# Patient Record
Sex: MALE | Race: ASIAN | URBAN - METROPOLITAN AREA
[De-identification: names, ages, dates, MRNs, and addresses within clinical notes are randomized per-mention and may not be internally consistent; named-entity substitution may affect disease eponyms.]

---

## 2017-04-12 ENCOUNTER — OFFICE VISIT (OUTPATIENT)
Dept: FAMILY MEDICINE | Facility: CLINIC | Age: 32
End: 2017-04-12
Payer: COMMERCIAL

## 2017-04-12 VITALS
WEIGHT: 170 LBS | DIASTOLIC BLOOD PRESSURE: 78 MMHG | HEIGHT: 68 IN | BODY MASS INDEX: 25.76 KG/M2 | HEART RATE: 76 BPM | SYSTOLIC BLOOD PRESSURE: 120 MMHG | TEMPERATURE: 98 F

## 2017-04-12 DIAGNOSIS — I10 BENIGN ESSENTIAL HYPERTENSION: ICD-10-CM

## 2017-04-12 DIAGNOSIS — R30.0 DYSURIA: ICD-10-CM

## 2017-04-12 DIAGNOSIS — E11.9 TYPE 2 DIABETES MELLITUS WITHOUT COMPLICATION, WITHOUT LONG-TERM CURRENT USE OF INSULIN (H): Primary | ICD-10-CM

## 2017-04-12 DIAGNOSIS — E78.5 HYPERLIPIDEMIA LDL GOAL <100: ICD-10-CM

## 2017-04-12 DIAGNOSIS — F10.10 ALCOHOL ABUSE, EPISODIC DRINKING BEHAVIOR: ICD-10-CM

## 2017-04-12 LAB
ALBUMIN UR-MCNC: NEGATIVE MG/DL
APPEARANCE UR: CLEAR
BILIRUB UR QL STRIP: NEGATIVE
COLOR UR AUTO: YELLOW
GLUCOSE UR STRIP-MCNC: >=1000 MG/DL
HBA1C MFR BLD: 8.8 % (ref 4.3–6)
HGB UR QL STRIP: NEGATIVE
KETONES UR STRIP-MCNC: NEGATIVE MG/DL
LEUKOCYTE ESTERASE UR QL STRIP: NEGATIVE
NITRATE UR QL: NEGATIVE
PH UR STRIP: 7 PH (ref 5–7)
SP GR UR STRIP: 1.01 (ref 1–1.03)
URN SPEC COLLECT METH UR: ABNORMAL
UROBILINOGEN UR STRIP-ACNC: 0.2 EU/DL (ref 0.2–1)

## 2017-04-12 PROCEDURE — 36415 COLL VENOUS BLD VENIPUNCTURE: CPT | Performed by: FAMILY MEDICINE

## 2017-04-12 PROCEDURE — 99214 OFFICE O/P EST MOD 30 MIN: CPT | Performed by: FAMILY MEDICINE

## 2017-04-12 PROCEDURE — 83036 HEMOGLOBIN GLYCOSYLATED A1C: CPT | Performed by: FAMILY MEDICINE

## 2017-04-12 PROCEDURE — 80053 COMPREHEN METABOLIC PANEL: CPT | Performed by: FAMILY MEDICINE

## 2017-04-12 PROCEDURE — 81003 URINALYSIS AUTO W/O SCOPE: CPT | Performed by: FAMILY MEDICINE

## 2017-04-12 RX ORDER — BLOOD-GLUCOSE METER
EACH MISCELLANEOUS
Qty: 1 KIT | Refills: 0 | Status: SHIPPED | OUTPATIENT
Start: 2017-04-12

## 2017-04-12 RX ORDER — LANCETS
EACH MISCELLANEOUS
Qty: 1 BOX | Status: SHIPPED | OUTPATIENT
Start: 2017-04-12

## 2017-04-12 NOTE — NURSING NOTE
"Chief Complaint   Patient presents with     Diabetes     f/u      Abdominal Pain       Initial /78 (BP Location: Left arm, Patient Position: Chair, Cuff Size: Adult Regular)  Pulse 76  Temp 98  F (36.7  C) (Tympanic)  Ht 5' 8\" (1.727 m)  Wt 170 lb (77.1 kg)  BMI 25.85 kg/m2 Estimated body mass index is 25.85 kg/(m^2) as calculated from the following:    Height as of this encounter: 5' 8\" (1.727 m).    Weight as of this encounter: 170 lb (77.1 kg).  Medication Reconciliation: complete  "

## 2017-04-12 NOTE — MR AVS SNAPSHOT
After Visit Summary   4/12/2017    Flakito Ureña    MRN: [id number]           Patient Information     Date Of Birth          1985        Visit Information        Provider Department      4/12/2017 4:00 PM [facility]        Today's Diagnoses     Type 2 diabetes mellitus without complication, without long-term current use of insulin (H)    -  1    Benign essential hypertension        Hyperlipidemia LDL goal <100        Dysuria        Alcohol abuse, episodic drinking behavior           Follow-ups after your visit        Who to contact     If you have questions or need follow up information about today's clinic visit or your schedule please contact [facility] directly at [phone].  Normal or non-critical lab and imaging results will be communicated to you by MyChart, letter or phone within 4 business days after the clinic has received the results. If you do not hear from us within 7 days, please contact the clinic through [employer] or phone. If you have a critical or abnormal lab result, we will notify you by phone as soon as possible.  Submit refill requests through [employer] or call your pharmacy and they will forward the refill request to us. Please allow 3 business days for your refill to be completed.          Additional Information About Your Visit        MyChart Information     [employer] gives you secure access to your electronic health record. If you see a primary care provider, you can also send messages to your care team and make appointments. If you have questions, please call your primary care clinic.  If you do not have a primary care provider, please call [phone] and they will assist you.        Care EveryWhere ID     This is your Care EveryWhere ID. This could be used by other organizations to access your [place] medical records  [id number]        Your Vitals Were     Pulse Temperature Height BMI (Body Mass Index)           "76 98  F (36.7  C) (Tympanic) 5' 8\" (1.727 m) 25.85 kg/m2         Blood Pressure from Last 3 Encounters:   04/12/17 120/78   11/30/16 138/88    Weight from Last 3 Encounters:   04/12/17 170 lb (77.1 kg)              We Performed the Following     Comprehensive metabolic panel     Hemoglobin A1c     UA reflex to Microscopic          Today's Medication Changes          These changes are accurate as of: 4/12/17  5:14 PM.  If you have any questions, ask your nurse or doctor.               Start taking these medicines.        Dose/Directions    blood glucose monitoring lancets   Used for:  Type 2 diabetes mellitus without complication, without long-term current use of insulin (H)   Started by:  Amari Garg MD        Use to test blood sugar one times daily or as directed.  Ok to substitute alternative if insurance prefers.   Quantity:  1 Box   Refills:  prn       blood glucose monitoring meter device kit   Used for:  Type 2 diabetes mellitus without complication, without long-term current use of insulin (H)   Started by:  Amari Garg MD        Use to test blood sugar 1 times daily or as directed.  Ok to substitute alternative if insurance prefers.   Quantity:  1 kit   Refills:  0       blood glucose monitoring test strip   Commonly known as:  ACCU-CHEK HILARIO PLUS   Used for:  Type 2 diabetes mellitus without complication, without long-term current use of insulin (H)   Started by:  Amari Garg MD        Use to test blood sugar 1 times daily or as directed.  Ok to substitute alternative if insurance prefers.   Quantity:  100 strip   Refills:  prn       insulin glargine 100 UNIT/ML injection   Commonly known as:  LANTUS SOLOSTAR   Used for:  Type 2 diabetes mellitus without complication, without long-term current use of insulin (H)   Started by:  Amari Garg MD        Dose:  15 Units   Inject 15 Units Subcutaneous At Bedtime Increase lantus to 2 units if morning blood suger consistently >150 over 1 week.   Quantity:  15 " mL   Refills:  1            Where to get your medicines      These medications were sent to [id number] - [place] [[facility] [place] - [address]  [address] [place] [address] [address] [address]     Phone:  [phone]     blood glucose monitoring lancets    blood glucose monitoring meter device kit    blood glucose monitoring test strip    insulin glargine 100 UNIT/ML injection    metFORMIN 1000 MG tablet                Primary Care Provider Office Phone # Fax #    Amari Garg [phone]       [facility] [address] [place] [address] [address]  [place] [address]        Thank you!     Thank you for choosing [facility]  for your care. Our goal is always to provide you with excellent care. Hearing back from our patients is one way we can continue to improve our services. Please take a few minutes to complete the written survey that you may receive in the mail after your visit with us. Thank you!             Your Updated Medication List - Protect others around you: Learn how to safely use, store and throw away your medicines at www.disposemymeds.org.          This list is accurate as of: 4/12/17  5:14 PM.  Always use your most recent med list.                   Brand Name Dispense Instructions for use    blood glucose monitoring lancets     1 Box    Use to test blood sugar one times daily or as directed.  Ok to substitute alternative if insurance prefers.       blood glucose monitoring meter device kit     1 kit    Use to test blood sugar 1 times daily or as directed.  Ok to substitute alternative if insurance prefers.       blood glucose monitoring test strip    ACCU-CHEK HILARIO PLUS    100 strip    Use to test blood sugar 1 times daily or as directed.  Ok to substitute alternative if insurance prefers.       fenofibrate 54 MG tablet     90 tablet    Take 1 tablet (54 mg) by mouth daily       insulin glargine 100 UNIT/ML injection    LANTUS SOLOSTAR    15 mL     Inject 15 Units Subcutaneous At Bedtime Increase lantus to 2 units if morning blood suger consistently >150 over 1 week.       losartan 25 MG tablet    COZAAR    90 tablet    Take 0.5 tablets (12.5 mg) by mouth daily       metFORMIN 1000 MG tablet    GLUCOPHAGE    180 tablet    Take 1 tablet (1,000 mg) by mouth 2 times daily (with meals)       metoprolol 25 MG 24 hr tablet    TOPROL-XL    90 tablet    Take 1 tablet (25 mg) by mouth daily       pantoprazole 40 MG EC tablet    PROTONIX    30 tablet    Take 1 tablet (40 mg) by mouth daily       VITAMIN D (CHOLECALCIFEROL) PO      Take by mouth daily

## 2017-04-12 NOTE — PROGRESS NOTES
SUBJECTIVE:                                                    Flakito Ureña is a 31 year old male who presents to clinic today for the following health issues:      Diabetes Follow-up  He was on metformin along with sitaglipitin combination but insurance did not cover it , out of pocket was high he just used metformin.  He has been taking it .    Patient is checking blood sugars: not at all    Diabetic concerns: None and other - feels like they have been high      Symptoms of hypoglycemia (low blood sugar): shaky, dizzy, weak, lethargy, blurred vision, confusion - past 3-4 days has been occurring frequently.      Paresthesias (numbness or burning in feet) or sores: Yes burning      Date of last diabetic eye exam: 2 years ago        Amount of exercise or physical activity: 2-3 days/week for an average of 30-45 minutes    Problems taking medications regularly: No    Medication side effects: none    Diet: regular (no restrictions)      ABDOMINAL and FLANK PAIN     Onset: 2 months     No nausea or vomiting, no pain, some time burning sensation with urination, no STD concerns.    No fever, chills.    Description:   Character: Dull ache  Location: epigastric region pelvic region  Radiation: None    Intensity: moderate    Progression of Symptoms:  intermittent    Accompanying Signs & Symptoms:  Fever/Chills?: no   Gas/Bloating: no   Nausea: no   Vomitting: no   Diarrhea?: YES  Constipation:no   Dysuria or Hematuria: YES- pt reports it is difficulty with urgency for a year, getting worse    History:   Trauma: no   Previous similar pain: no    Previous tests done: none    Precipitating factors:   Does the pain change with:     Food: no      BM: no     Urination: no     Alleviating factors:  na    Therapies Tried and outcome: na    LMP:  not applicable       Problem list and histories reviewed & adjusted, as indicated.  Additional history: as documented        Reviewed and updated as needed this visit by clinical  "staff  Tobacco  Allergies  Meds  Problems  Soc Hx      Reviewed and updated as needed this visit by Provider  Allergies  Meds  Problems         ROS:  C: NEGATIVE for fever, chills, change in weight  E/M: NEGATIVE for ear, mouth and throat problems  R: NEGATIVE for significant cough or SOB  CV: NEGATIVE for chest pain, palpitations or peripheral edema    OBJECTIVE:                                                    /78 (BP Location: Left arm, Patient Position: Chair, Cuff Size: Adult Regular)  Pulse 76  Temp 98  F (36.7  C) (Tympanic)  Ht 5' 8\" (1.727 m)  Wt 170 lb (77.1 kg)  BMI 25.85 kg/m2  Body mass index is 25.85 kg/(m^2).   GENERAL: healthy, alert, well nourished, well hydrated, no distress  HENT: ear canals- normal; TMs- normal; Nose- normal; Mouth- no ulcers, no lesions  NECK: no tenderness, no adenopathy, no asymmetry, no masses, no stiffness; thyroid- normal to palpation  RESP: lungs clear to auscultation - no rales, no rhonchi, no wheezes  CV: regular rates and rhythm, normal S1 S2, no S3 or S4 and no murmur, no click or rub -  ABDOMEN: soft, no tenderness, no  hepatosplenomegaly, no masses, normal bowel sounds         ASSESSMENT/PLAN:                                                        ICD-10-CM    1. Type 2 diabetes mellitus without complication, without long-term current use of insulin (H) E11.9 Hemoglobin A1c     insulin glargine (LANTUS SOLOSTAR) 100 UNIT/ML injection     blood glucose monitoring (ACCU-CHEK HILARIO PLUS) test strip     blood glucose monitoring (ACCU-CHEK HILARIO PLUS) meter device kit     blood glucose monitoring (SOFTCLIX) lancets     metFORMIN (GLUCOPHAGE) 1000 MG tablet   2. Benign essential hypertension I10 Comprehensive metabolic panel   3. Hyperlipidemia LDL goal <100 E78.5 Comprehensive metabolic panel   4. Dysuria R30.0 UA reflex to Microscopic   5. Alcohol abuse, episodic drinking behavior F10.10        Patient AIC is elevated, his diet is not great, we will " continue metformin along with that we will start him on lantus 15 units at night, he should increase 2 units weekly if fasting number are>150 persistently. Can go up to 20 units max, if still not controled like fasting between  and random<180, he should follow up.  Check your blood sugar once  Day, follow up in 2 months. alcohol intake is dicussed in detail, he needs to cut down, this is not helping him, he will work on it. No signs of any acute abdomen, increase urination most likely due to the uncontrolled diabetes.    Amari Garg MD  [facility]

## 2017-04-13 DIAGNOSIS — E11.9 TYPE 2 DIABETES MELLITUS WITHOUT COMPLICATION, WITHOUT LONG-TERM CURRENT USE OF INSULIN (H): Primary | ICD-10-CM

## 2017-04-13 LAB
ALBUMIN SERPL-MCNC: 4.4 G/DL (ref 3.4–5)
ALP SERPL-CCNC: 89 U/L (ref 40–150)
ALT SERPL W P-5'-P-CCNC: 74 U/L (ref 0–70)
ANION GAP SERPL CALCULATED.3IONS-SCNC: 14 MMOL/L (ref 3–14)
AST SERPL W P-5'-P-CCNC: 32 U/L (ref 0–45)
BILIRUB SERPL-MCNC: 0.3 MG/DL (ref 0.2–1.3)
BUN SERPL-MCNC: 9 MG/DL (ref 7–30)
CALCIUM SERPL-MCNC: 9.3 MG/DL (ref 8.5–10.1)
CHLORIDE SERPL-SCNC: 96 MMOL/L (ref 94–109)
CO2 SERPL-SCNC: 24 MMOL/L (ref 20–32)
CREAT SERPL-MCNC: 0.84 MG/DL (ref 0.66–1.25)
GFR SERPL CREATININE-BSD FRML MDRD: ABNORMAL ML/MIN/1.7M2
GLUCOSE SERPL-MCNC: 290 MG/DL (ref 70–99)
POTASSIUM SERPL-SCNC: 4.2 MMOL/L (ref 3.4–5.3)
PROT SERPL-MCNC: 8.4 G/DL (ref 6.8–8.8)
SODIUM SERPL-SCNC: 133 MMOL/L (ref 133–144)

## 2017-04-13 RX ORDER — PANTOPRAZOLE SODIUM 40 MG/1
40 TABLET, DELAYED RELEASE ORAL DAILY
Qty: 90 TABLET | Refills: 3 | Status: SHIPPED | OUTPATIENT
Start: 2017-04-13 | End: 2018-04-23

## 2017-04-13 NOTE — TELEPHONE ENCOUNTER
Protonix: Prescription approved per [facility], [facility] or [facility] refill protocol.  Li Cruz RN  [employer]        Routing refill request to provider for review/approval because:  Medication is reported/historical  Li Cruz RN  [employer]

## 2017-04-13 NOTE — TELEPHONE ENCOUNTER
Pen needles         Last Written Prescription Date: historical  Last Fill Quantity: ?, # refills: ?  Last Office Visit with [facility], [facility] or [facility] prescribing provider:  4/12/17        BP Readings from Last 3 Encounters:   04/12/17 120/78   11/30/16 138/88     Lab Results   Component Value Date    MICROL 98 12/02/2016     Lab Results   Component Value Date    UMALCR 28.24 12/02/2016     Creatinine   Date Value Ref Range Status   12/02/2016 0.80 0.66 - 1.25 mg/dL Final   ]  GFR Estimate   Date Value Ref Range Status   12/02/2016 >90  Non  GFR Calc   >60 mL/min/1.7m2 Final     GFR Estimate If Black   Date Value Ref Range Status   12/02/2016 >90   GFR Calc   >60 mL/min/1.7m2 Final     Lab Results   Component Value Date    CHOL 185 12/02/2016     Lab Results   Component Value Date    HDL 31 12/02/2016     Lab Results   Component Value Date    LDL  12/02/2016     Cannot estimate LDL when triglyceride exceeds 400 mg/dL     Lab Results   Component Value Date    TRIG 515 12/02/2016     No results found for: CHOLHDLRATIO  Lab Results   Component Value Date    AST 32 12/02/2016     Lab Results   Component Value Date    ALT 64 12/02/2016     Lab Results   Component Value Date    A1C 8.8 04/12/2017    A1C 7.8 12/02/2016     Potassium   Date Value Ref Range Status   12/02/2016 4.1 3.4 - 5.3 mmol/L Final     pantoprazole (PROTONIX) 40 MG      Last Written Prescription Date: 12/2/16  Last Fill Quantity: 30,  # refills: 3   Last Office Visit with [facility], [facility] or [facility] prescribing provider: 4/12/17

## 2017-05-02 DIAGNOSIS — E11.9 TYPE 2 DIABETES MELLITUS WITHOUT COMPLICATION, WITHOUT LONG-TERM CURRENT USE OF INSULIN (H): Primary | ICD-10-CM

## 2017-05-02 RX ORDER — BLOOD-GLUCOSE METER
EACH MISCELLANEOUS
Qty: 1 KIT | Refills: 0 | Status: SHIPPED | OUTPATIENT
Start: 2017-05-02

## 2017-05-02 NOTE — TELEPHONE ENCOUNTER
Note from [employer] stating accu chek irena plus is not covered.  Lifeschan(onetouch) is preferred alternative.     New order has been pended. Please sign.    Panda CORNEJO CMA

## 2017-06-11 DIAGNOSIS — E78.5 HYPERLIPIDEMIA LDL GOAL <100: ICD-10-CM

## 2017-06-11 DIAGNOSIS — E78.1 HIGH BLOOD TRIGLYCERIDES: ICD-10-CM

## 2017-06-12 NOTE — TELEPHONE ENCOUNTER
fenofibrate 54 MG tablet  Last Written Prescription Date: 12/2/2016  Last Fill Quantity: 90, # refills: 1    Last Office Visit with [facility] or [facility] prescribing provider:  4/12/2017  Future Office Visit:      Cholesterol   Date Value Ref Range Status   12/02/2016 185 <200 mg/dL Final     HDL Cholesterol   Date Value Ref Range Status   12/02/2016 31 (L) >39 mg/dL Final     LDL Cholesterol Calculated   Date Value Ref Range Status   12/02/2016  <100 mg/dL Final    Cannot estimate LDL when triglyceride exceeds 400 mg/dL     Triglycerides   Date Value Ref Range Status   12/02/2016 515 (H) <150 mg/dL Final     Comment:     Borderline high:  150-199 mg/dl   High:             200-499 mg/dl   Very high:       >499 mg/dl   Fasting specimen       ALT   Date Value Ref Range Status   04/12/2017 74 (H) 0 - 70 U/L Final        Jamila Belcher CMA

## 2017-06-13 RX ORDER — FENOFIBRATE 54 MG/1
TABLET ORAL
Qty: 30 TABLET | Refills: 1 | Status: SHIPPED | OUTPATIENT
Start: 2017-06-13 | End: 2017-09-01

## 2017-06-13 NOTE — TELEPHONE ENCOUNTER
Routing refill request to provider for review/approval because:  Labs out of range:  ;ipids.  Jackie Enriquez RN  [facility]  [phone]

## 2017-08-16 ENCOUNTER — OFFICE VISIT (OUTPATIENT)
Dept: FAMILY MEDICINE | Facility: CLINIC | Age: 32
End: 2017-08-16
Payer: COMMERCIAL

## 2017-08-16 VITALS
WEIGHT: 173 LBS | OXYGEN SATURATION: 99 % | SYSTOLIC BLOOD PRESSURE: 130 MMHG | TEMPERATURE: 96.4 F | BODY MASS INDEX: 26.3 KG/M2 | HEART RATE: 97 BPM | DIASTOLIC BLOOD PRESSURE: 80 MMHG

## 2017-08-16 DIAGNOSIS — Z79.4 TYPE 2 DIABETES MELLITUS WITHOUT COMPLICATION, WITH LONG-TERM CURRENT USE OF INSULIN (H): ICD-10-CM

## 2017-08-16 DIAGNOSIS — E78.5 HYPERLIPIDEMIA LDL GOAL <100: ICD-10-CM

## 2017-08-16 DIAGNOSIS — Z13.89 SCREENING FOR DIABETIC PERIPHERAL NEUROPATHY: Primary | ICD-10-CM

## 2017-08-16 DIAGNOSIS — E11.9 TYPE 2 DIABETES MELLITUS WITHOUT COMPLICATION, WITHOUT LONG-TERM CURRENT USE OF INSULIN (H): ICD-10-CM

## 2017-08-16 DIAGNOSIS — E11.9 TYPE 2 DIABETES MELLITUS WITHOUT COMPLICATION, WITH LONG-TERM CURRENT USE OF INSULIN (H): ICD-10-CM

## 2017-08-16 DIAGNOSIS — F10.10 ALCOHOL ABUSE, EPISODIC DRINKING BEHAVIOR: ICD-10-CM

## 2017-08-16 LAB — HBA1C MFR BLD: 7.6 % (ref 4.3–6)

## 2017-08-16 PROCEDURE — 36415 COLL VENOUS BLD VENIPUNCTURE: CPT | Performed by: FAMILY MEDICINE

## 2017-08-16 PROCEDURE — 82043 UR ALBUMIN QUANTITATIVE: CPT | Performed by: FAMILY MEDICINE

## 2017-08-16 PROCEDURE — 83036 HEMOGLOBIN GLYCOSYLATED A1C: CPT | Performed by: FAMILY MEDICINE

## 2017-08-16 PROCEDURE — 99214 OFFICE O/P EST MOD 30 MIN: CPT | Performed by: FAMILY MEDICINE

## 2017-08-16 PROCEDURE — 99207 C FOOT EXAM  NO CHARGE: CPT | Performed by: FAMILY MEDICINE

## 2017-08-16 NOTE — PROGRESS NOTES
SUBJECTIVE:                                                    Flakito Ureña is a 32 year old male who presents to clinic today for the following health issues:      Diabetes Follow-up  Control is slight better with the addition of lantus.   Continue to have episodic drinking issues.  sedentary life style, food choices are not great.  No chest pain or shortness of breath.      Patient is checking blood sugars: rarely.      Diabetic concerns: other - blood sugars over 200 over 15/30 days      Symptoms of hypoglycemia (low blood sugar): shaky, dizzy, weak after exercise      Paresthesias (numbness or burning in feet) or sores: Yes occasional burning      Date of last diabetic eye exam: 2-3 years ago        Amount of exercise or physical activity: 4 days per week     Problems taking medications regularly: No    Medication side effects: none  Diet: regular (no restrictions)        Issue with insomnia.      Problem list and histories reviewed & adjusted, as indicated.  Additional history: as documented    Patient Active Problem List   Diagnosis     Benign essential hypertension     Hyperlipidemia LDL goal <100     Pulmonary TB treated in 2009     Alcohol abuse, episodic drinking behavior     Type 2 diabetes mellitus without complication, with long-term current use of insulin (H)     Past Surgical History:   Procedure Laterality Date     THROAT SURGERY      vocal cord surgery       Social History   Substance Use Topics     Smoking status: Former Smoker     Smokeless tobacco: Never Used     Alcohol use 0.0 oz/week     0 Standard drinks or equivalent per week     Family History   Problem Relation Age of Onset     DIABETES Father      DIABETES Mother      DIABETES Brother          Current Outpatient Prescriptions   Medication Sig Dispense Refill     insulin glargine (LANTUS SOLOSTAR) 100 UNIT/ML injection Inject 15 Units Subcutaneous At Bedtime Increase lantus to 2 units if morning blood suger consistently >150 over 1  week. 15 mL 1     metFORMIN (GLUCOPHAGE) 1000 MG tablet Take 1 tablet (1,000 mg) by mouth 2 times daily (with meals) 180 tablet 1     fenofibrate 54 MG tablet TAKE ONE TABLET BY MOUTH ONE TIME DAILY  30 tablet 1     pantoprazole (PROTONIX) 40 MG EC tablet Take 1 tablet (40 mg) by mouth daily 90 tablet 3     losartan (COZAAR) 25 MG tablet Take 0.5 tablets (12.5 mg) by mouth daily 90 tablet 1     VITAMIN D, CHOLECALCIFEROL, PO Take by mouth daily       metoprolol (TOPROL-XL) 25 MG 24 hr tablet Take 1 tablet (25 mg) by mouth daily 90 tablet 3     blood glucose monitoring (NO BRAND SPECIFIED) test strip Use to test blood sugars 1 times daily or as directed 100 strip 11     blood glucose monitoring (ONE TOUCH ULTRA 2) meter device kit Use to test blood sugars 1 times daily or as directed. 1 kit 0     insulin pen needle (BD ALEXANDRO U/F) 32G X 4 MM Use 1 daily or as directed. 100 each prn     blood glucose monitoring (ACCU-CHEK HILARIO PLUS) test strip Use to test blood sugar 1 times daily or as directed.  Ok to substitute alternative if insurance prefers. 100 strip prn     blood glucose monitoring (ACCU-CHEK HILARIO PLUS) meter device kit Use to test blood sugar 1 times daily or as directed.  Ok to substitute alternative if insurance prefers. 1 kit 0     blood glucose monitoring (SOFTCLIX) lancets Use to test blood sugar one times daily or as directed.  Ok to substitute alternative if insurance prefers. 1 Box prn         Reviewed and updated as needed this visit by clinical staffTobacco  Allergies  Meds  Soc Hx      Reviewed and updated as needed this visit by Provider         ROS:  C: NEGATIVE for fever, chills, change in weight  E/M: NEGATIVE for ear, mouth and throat problems  R: NEGATIVE for significant cough or SOB  CV: NEGATIVE for chest pain, palpitations or peripheral edema    OBJECTIVE:                                                    /80  Pulse 97  Temp 96.4  F (35.8  C) (Tympanic)  Wt 173 lb (78.5 kg)   SpO2 99%  BMI 26.3 kg/m2  Body mass index is 26.3 kg/(m^2).   GENERAL: healthy, alert, well nourished, well hydrated, no distress  HENT: ear canals- normal; TMs- normal; Nose- normal; Mouth- no ulcers, no lesions  NECK: no tenderness, no adenopathy, no asymmetry, no masses, no stiffness; thyroid- normal to palpation  RESP: lungs clear to auscultation - no rales, no rhonchi, no wheezes  CV: regular rates and rhythm, normal S1 S2, no S3 or S4 and no murmur, no click or rub -  ABDOMEN: soft, no tenderness, no  hepatosplenomegaly, no masses, normal bowel sounds         ASSESSMENT/PLAN:                                                        ICD-10-CM    1. Screening for diabetic peripheral neuropathy Z13.89 FOOT EXAM  NO CHARGE [61603.114]   2. Type 2 diabetes mellitus without complication, with long-term current use of insulin (H) E11.9 FOOT EXAM  NO CHARGE [05294.114]    Z79.4 Hemoglobin A1c     Albumin Random Urine Quantitative   3. Hyperlipidemia LDL goal <100 E78.5    4. Alcohol abuse, episodic drinking behavior F10.10    5. Type 2 diabetes mellitus without complication, without long-term current use of insulin (H) E11.9 insulin glargine (LANTUS SOLOSTAR) 100 UNIT/ML injection     metFORMIN (GLUCOPHAGE) 1000 MG tablet       Patient AIC is better, since addition of lantus. Suggested continue the metformin and lantus. Work on dietary habits and avoid excessive drinking.  Labs ordered will follow up on that.  Insomnia issues. suggested OTC melatonin to see if it helps.    Amrai Garg MD  [facility]

## 2017-08-16 NOTE — MR AVS SNAPSHOT
After Visit Summary   8/16/2017    Flakito Ureña    MRN: [id number]           Patient Information     Date Of Birth          1985        Visit Information        Provider Department      8/16/2017 6:00 PM Amari Garg MD [facility]        Today's Diagnoses     Screening for diabetic peripheral neuropathy    -  1    Type 2 diabetes mellitus without complication, with long-term current use of insulin (H)        Hyperlipidemia LDL goal <100        Alcohol abuse, episodic drinking behavior        Type 2 diabetes mellitus without complication, without long-term current use of insulin (H)           Follow-ups after your visit        Your next 10 appointments already scheduled     Aug 22, 2017  8:00 AM CDT   LAB with EC LAB   [facility] ([facility])    [address] [place] [id number] [phone]           OUTSIDE LABS: Please include name of facility and Physician that is requesting outside labs be drawn.  Please indicate if labs are fasting or non-fasting on appt notes.  Be as specific as you can on which labs are being drawn.              Who to contact     If you have questions or need follow up information about today's clinic visit or your schedule please contact [facility] directly at [phone].  Normal or non-critical lab and imaging results will be communicated to you by MyChart, letter or phone within 4 business days after the clinic has received the results. If you do not hear from us within 7 days, please contact the clinic through MyChart or phone. If you have a critical or abnormal lab result, we will notify you by phone as soon as possible.  Submit refill requests through [email or web address] or call your pharmacy and they will forward the refill request to us. Please allow 3 business days for your refill to be completed.          Additional Information About Your Visit        MyChart Information      [employer] gives you secure access to your electronic health record. If you see a primary care provider, you can also send messages to your care team and make appointments. If you have questions, please call your primary care clinic.  If you do not have a primary care provider, please call [phone] and they will assist you.        Care EveryWhere ID     This is your Care EveryWhere ID. This could be used by other organizations to access your [place] medical records  [id number]        Your Vitals Were     Pulse Temperature Pulse Oximetry BMI (Body Mass Index)          97 96.4  F (35.8  C) (Tympanic) 99% 26.3 kg/m2         Blood Pressure from Last 3 Encounters:   08/16/17 130/80   04/12/17 120/78   11/30/16 138/88    Weight from Last 3 Encounters:   08/16/17 173 lb (78.5 kg)   04/12/17 170 lb (77.1 kg)              We Performed the Following     Albumin Random Urine Quantitative     FOOT EXAM  NO CHARGE [[id number]]     Hemoglobin A1c          Where to get your medicines      These medications were sent to [facility] [id number] [place], [place] [address] AT [facility] [address]  [address], [facility] [address]    Hours:  24-hours Phone:  [phone]     insulin glargine 100 UNIT/ML injection    metFORMIN 1000 MG tablet          Primary Care Provider Office Phone # Fax #    Amari Garg [phone]       [address]  [place] [address]        Equal Access to Services     [facility]: Hadii aad ku hadasho Soomaali, waaxda luqadaha, qaybta kaalmada adeegyada, hiram castro haydominick miranda. So [facility] [phone].    ATENCIÓN: Si habla español, tiene a escobar disposición servicios gratuitos de asistencia lingüística. Llame al [phone].    We comply with applicable federal civil rights laws and [place] laws. We do not discriminate on the basis of race, color, national origin, age, disability sex, sexual orientation or gender  identity.            Thank you!     Thank you for choosing [facility] [place]  for your care. Our goal is always to provide you with excellent care. Hearing back from our patients is one way we can continue to improve our services. Please take a few minutes to complete the written survey that you may receive in the mail after your visit with us. Thank you!             Your Updated Medication List - Protect others around you: Learn how to safely use, store and throw away your medicines at www.disposemymeds.org.          This list is accurate as of: 8/16/17 11:59 PM.  Always use your most recent med list.                   Brand Name Dispense Instructions for use Diagnosis    blood glucose monitoring lancets     1 Box    Use to test blood sugar one times daily or as directed.  Ok to substitute alternative if insurance prefers.    Type 2 diabetes mellitus without complication, without long-term current use of insulin (H)       * blood glucose monitoring meter device kit     1 kit    Use to test blood sugar 1 times daily or as directed.  Ok to substitute alternative if insurance prefers.    Type 2 diabetes mellitus without complication, without long-term current use of insulin (H)       * blood glucose monitoring meter device kit     1 kit    Use to test blood sugars 1 times daily or as directed.    Type 2 diabetes mellitus without complication, without long-term current use of insulin (H)       * blood glucose monitoring test strip    ACCU-CHEK HILARIO PLUS    100 strip    Use to test blood sugar 1 times daily or as directed.  Ok to substitute alternative if insurance prefers.    Type 2 diabetes mellitus without complication, without long-term current use of insulin (H)       * blood glucose monitoring test strip    no brand specified    100 strip    Use to test blood sugars 1 times daily or as directed    Type 2 diabetes mellitus without complication, without long-term current use of insulin (H)        fenofibrate 54 MG tablet     30 tablet    TAKE ONE TABLET BY MOUTH ONE TIME DAILY    Hyperlipidemia LDL goal <100, High blood triglycerides       insulin glargine 100 UNIT/ML injection    LANTUS SOLOSTAR    15 mL    Inject 15 Units Subcutaneous At Bedtime Increase lantus to 2 units if morning blood suger consistently >150 over 1 week.    Type 2 diabetes mellitus without complication, without long-term current use of insulin (H)       insulin pen needle 32G X 4 MM    BD ALEXANDRO U/F    100 each    Use 1 daily or as directed.    Type 2 diabetes mellitus without complication, without long-term current use of insulin (H)       losartan 25 MG tablet    COZAAR    90 tablet    Take 0.5 tablets (12.5 mg) by mouth daily    Type 2 diabetes mellitus without complication, without long-term current use of insulin (H), Benign essential hypertension, Routine general medical examination at a health care facility       metFORMIN 1000 MG tablet    GLUCOPHAGE    180 tablet    Take 1 tablet (1,000 mg) by mouth 2 times daily (with meals)    Type 2 diabetes mellitus without complication, without long-term current use of insulin (H)       metoprolol 25 MG 24 hr tablet    TOPROL-XL    90 tablet    Take 1 tablet (25 mg) by mouth daily    Benign essential hypertension       pantoprazole 40 MG EC tablet    PROTONIX    90 tablet    Take 1 tablet (40 mg) by mouth daily    Type 2 diabetes mellitus without complication, without long-term current use of insulin (H)       VITAMIN D (CHOLECALCIFEROL) PO      Take by mouth daily        * Notice:  This list has 4 medication(s) that are the same as other medications prescribed for you. Read the directions carefully, and ask your doctor or other care provider to review them with you.

## 2017-08-16 NOTE — NURSING NOTE
"Chief Complaint   Patient presents with     Diabetes     not fasting        Initial /80  Pulse 97  Temp 96.4  F (35.8  C) (Tympanic)  Wt 173 lb (78.5 kg)  SpO2 99%  BMI 26.3 kg/m2 Estimated body mass index is 26.3 kg/(m^2) as calculated from the following:    Height as of 4/12/17: 5' 8\" (1.727 m).    Weight as of this encounter: 173 lb (78.5 kg).  Medication Reconciliation: complete    Current Outpatient Prescriptions   Medication Sig Dispense Refill     fenofibrate 54 MG tablet TAKE ONE TABLET BY MOUTH ONE TIME DAILY  30 tablet 1     pantoprazole (PROTONIX) 40 MG EC tablet Take 1 tablet (40 mg) by mouth daily 90 tablet 3     insulin glargine (LANTUS SOLOSTAR) 100 UNIT/ML injection Inject 15 Units Subcutaneous At Bedtime Increase lantus to 2 units if morning blood suger consistently >150 over 1 week. 15 mL 1     metFORMIN (GLUCOPHAGE) 1000 MG tablet Take 1 tablet (1,000 mg) by mouth 2 times daily (with meals) 180 tablet 1     losartan (COZAAR) 25 MG tablet Take 0.5 tablets (12.5 mg) by mouth daily 90 tablet 1     VITAMIN D, CHOLECALCIFEROL, PO Take by mouth daily       metoprolol (TOPROL-XL) 25 MG 24 hr tablet Take 1 tablet (25 mg) by mouth daily 90 tablet 3     blood glucose monitoring (NO BRAND SPECIFIED) test strip Use to test blood sugars 1 times daily or as directed 100 strip 11     blood glucose monitoring (ONE TOUCH ULTRA 2) meter device kit Use to test blood sugars 1 times daily or as directed. 1 kit 0     insulin pen needle (BD ALEXANDRO U/F) 32G X 4 MM Use 1 daily or as directed. 100 each prn     blood glucose monitoring (ACCU-CHEK HILARIO PLUS) test strip Use to test blood sugar 1 times daily or as directed.  Ok to substitute alternative if insurance prefers. 100 strip prn     blood glucose monitoring (ACCU-CHEK HILARIO PLUS) meter device kit Use to test blood sugar 1 times daily or as directed.  Ok to substitute alternative if insurance prefers. 1 kit 0     blood glucose monitoring (SOFTCLIX) lancets " Use to test blood sugar one times daily or as directed.  Ok to substitute alternative if insurance prefers. 1 Box prn       Panda CORNEJO, CMA

## 2017-08-17 LAB
CREAT UR-MCNC: 14 MG/DL
MICROALBUMIN UR-MCNC: <5 MG/L
MICROALBUMIN/CREAT UR: NORMAL MG/G CR (ref 0–17)

## 2017-09-01 DIAGNOSIS — E78.5 HYPERLIPIDEMIA LDL GOAL <100: ICD-10-CM

## 2017-09-01 DIAGNOSIS — E78.1 HIGH BLOOD TRIGLYCERIDES: ICD-10-CM

## 2017-09-01 NOTE — TELEPHONE ENCOUNTER
Fenofibrate 54 MG tablet       Last Written Prescription Date: 06/13/2017  Last Fill Quantity: 30 tablet, # refills: 1    Last Office Visit with G, P or [facility] prescribing provider:  08/16/2017   Future Office Visit:      Cholesterol   Date Value Ref Range Status   12/02/2016 185 <200 mg/dL Final     HDL Cholesterol   Date Value Ref Range Status   12/02/2016 31 (L) >39 mg/dL Final     LDL Cholesterol Calculated   Date Value Ref Range Status   12/02/2016  <100 mg/dL Final    Cannot estimate LDL when triglyceride exceeds 400 mg/dL     Triglycerides   Date Value Ref Range Status   12/02/2016 515 (H) <150 mg/dL Final     Comment:     Borderline high:  150-199 mg/dl   High:             200-499 mg/dl   Very high:       >499 mg/dl   Fasting specimen       ALT   Date Value Ref Range Status   04/12/2017 74 (H) 0 - 70 U/L Final        Christen Rodrigez MA

## 2017-09-05 RX ORDER — FENOFIBRATE 54 MG/1
TABLET ORAL
Qty: 30 TABLET | Refills: 0 | Status: SHIPPED | OUTPATIENT
Start: 2017-09-05 | End: 2017-09-22

## 2017-09-05 NOTE — TELEPHONE ENCOUNTER
Routing refill request to provider for review/approval because:  Labs out of range:  Lipids    Jackie Enriquez,RN  [facility]  [phone]

## 2017-09-20 DIAGNOSIS — E78.1 HIGH BLOOD TRIGLYCERIDES: ICD-10-CM

## 2017-09-20 DIAGNOSIS — E78.5 HYPERLIPIDEMIA LDL GOAL <100: ICD-10-CM

## 2017-09-20 PROCEDURE — 80061 LIPID PANEL: CPT | Performed by: FAMILY MEDICINE

## 2017-09-20 PROCEDURE — 36415 COLL VENOUS BLD VENIPUNCTURE: CPT | Performed by: FAMILY MEDICINE

## 2017-09-21 LAB
CHOLEST SERPL-MCNC: 200 MG/DL
HDLC SERPL-MCNC: 41 MG/DL
LDLC SERPL CALC-MCNC: 99 MG/DL
NONHDLC SERPL-MCNC: 159 MG/DL
TRIGL SERPL-MCNC: 299 MG/DL

## 2017-09-22 RX ORDER — FENOFIBRATE 54 MG/1
54 TABLET ORAL DAILY
Qty: 30 TABLET | Refills: 6 | Status: SHIPPED | OUTPATIENT
Start: 2017-09-22 | End: 2018-03-08

## 2017-10-10 DIAGNOSIS — E78.1 HIGH BLOOD TRIGLYCERIDES: ICD-10-CM

## 2017-10-10 DIAGNOSIS — E78.5 HYPERLIPIDEMIA LDL GOAL <100: ICD-10-CM

## 2017-10-11 RX ORDER — FENOFIBRATE 54 MG/1
TABLET ORAL
Qty: 30 TABLET | Refills: 0 | OUTPATIENT
Start: 2017-10-11

## 2017-10-11 NOTE — TELEPHONE ENCOUNTER
Patient has refills on fie, request denied.   Siomara Alexandra RN   [facility] - Triage

## 2017-10-11 NOTE — TELEPHONE ENCOUNTER
Fenofibrate       Last Written Prescription Date: 9/22/17  Last Fill Quantity: 30, # refills: 6    Last Office Visit with [facility], [facility] or [facility] prescribing provider:  8/16/17   Future Office Visit:      Cholesterol   Date Value Ref Range Status   09/20/2017 200 (H) <200 mg/dL Final     Comment:     Desirable:       <200 mg/dl     HDL Cholesterol   Date Value Ref Range Status   09/20/2017 41 >39 mg/dL Final     LDL Cholesterol Calculated   Date Value Ref Range Status   09/20/2017 99 <100 mg/dL Final     Comment:     Desirable:       <100 mg/dl     Triglycerides   Date Value Ref Range Status   09/20/2017 299 (H) <150 mg/dL Final     Comment:     Borderline high:  150-199 mg/dl  High:             200-499 mg/dl  Very high:       >499 mg/dl  Fasting specimen       ALT   Date Value Ref Range Status   04/12/2017 74 (H) 0 - 70 U/L Final      Yulissa Roe CMA

## 2017-10-18 DIAGNOSIS — E11.9 TYPE 2 DIABETES MELLITUS WITHOUT COMPLICATION, WITHOUT LONG-TERM CURRENT USE OF INSULIN (H): ICD-10-CM

## 2017-10-19 DIAGNOSIS — E11.9 TYPE 2 DIABETES MELLITUS WITHOUT COMPLICATION, WITHOUT LONG-TERM CURRENT USE OF INSULIN (H): ICD-10-CM

## 2017-10-19 RX ORDER — INSULIN GLARGINE 100 [IU]/ML
INJECTION, SOLUTION SUBCUTANEOUS
Qty: 3 ML | Refills: 0 | OUTPATIENT
Start: 2017-10-19

## 2017-10-19 NOTE — TELEPHONE ENCOUNTER
Patient has current rx at [facility] [address]. Pharmacy notified. Yris Sanchez RN

## 2017-10-23 RX ORDER — INSULIN GLARGINE 100 [IU]/ML
INJECTION, SOLUTION SUBCUTANEOUS
Qty: 15 ML | Refills: 0 | Status: SHIPPED | OUTPATIENT
Start: 2017-10-23 | End: 2018-02-03

## 2017-10-23 NOTE — TELEPHONE ENCOUNTER
Protocol passed  Refill request approved per [facility] protocol    Florencia Salinas RN

## 2018-02-14 DIAGNOSIS — I10 BENIGN ESSENTIAL HYPERTENSION: ICD-10-CM

## 2018-02-14 DIAGNOSIS — Z00.00 ROUTINE GENERAL MEDICAL EXAMINATION AT A HEALTH CARE FACILITY: ICD-10-CM

## 2018-02-14 DIAGNOSIS — E11.9 TYPE 2 DIABETES MELLITUS WITHOUT COMPLICATION, WITHOUT LONG-TERM CURRENT USE OF INSULIN (H): ICD-10-CM

## 2018-02-14 NOTE — TELEPHONE ENCOUNTER
"Requested Prescriptions   Pending Prescriptions Disp Refills     losartan (COZAAR) 25 MG tablet [Pharmacy Med Name: Losartan Potassium Oral Tablet 25 MG]  Last Written Prescription Date:  12/11/17  Last Fill Quantity: 15,  # refills: 1   Last office visit: 8/16/2017 with prescribing provider:  [facility]   Future Office Visit:     15 tablet 0     Sig: TAKE 0.5 TABLET BY MOUTH A DAY    Angiotensin-II Receptors Passed    2/14/2018 12:41 PM       Passed - Blood pressure under 140/90 in past 12 months    BP Readings from Last 3 Encounters:   08/16/17 130/80   04/12/17 120/78   11/30/16 138/88                Passed - Recent or future visit with authorizing provider's specialty    Patient had office visit in the last year or has a visit in the next 30 days with authorizing provider.  See \"Patient Info\" tab in inbasket, or \"Choose Columns\" in Meds & Orders section of the refill encounter.            Passed - Patient is age 18 or older       Passed - Normal serum creatinine on file in past 12 months    Recent Labs   Lab Test  04/12/17   1644   CR  0.84            Passed - Normal serum potassium on file in past 12 months    Recent Labs   Lab Test  04/12/17   1644   POTASSIUM  4.2                      "

## 2018-02-15 RX ORDER — LOSARTAN POTASSIUM 25 MG/1
TABLET ORAL
Qty: 45 TABLET | Refills: 1 | Status: SHIPPED | OUTPATIENT
Start: 2018-02-15 | End: 2018-03-07

## 2018-02-15 NOTE — TELEPHONE ENCOUNTER
Prescription approved per [facility], [facility] or [facility] refill protocol.  Li Cruz RN - Triage  [facility]

## 2018-02-21 DIAGNOSIS — E11.9 TYPE 2 DIABETES MELLITUS WITHOUT COMPLICATION, WITHOUT LONG-TERM CURRENT USE OF INSULIN (H): ICD-10-CM

## 2018-02-22 RX ORDER — INSULIN GLARGINE 100 [IU]/ML
INJECTION, SOLUTION SUBCUTANEOUS
Qty: 3 ML | Refills: 0 | Status: SHIPPED | OUTPATIENT
Start: 2018-02-22 | End: 2018-03-07

## 2018-02-22 NOTE — TELEPHONE ENCOUNTER
"Requested Prescriptions   Pending Prescriptions Disp Refills     LANTUS SOLOSTAR 100 UNIT/ML soln [Pharmacy Med Name: Lantus SoloStar Subcutaneous Solution Pen-injector 100 UNIT/ML] 3 mL 0    Last Written Prescription Date:  02/05/2018  Last Fill Quantity: 3 mL,  # refills: 0 Needs office visit for further refills  Last office visit: 8/16/2017 with prescribing provider:  08/16/2017 Amari Garg MD  Future Office Visit:     Sig: Inject 15 units subcutaneOusly at bedtime and incrEase to 2 units if morninG blood sugar consistentlY >150 over one week    Long Acting Insulin Protocol Failed    2/21/2018  7:55 PM       Failed - HgbA1C in past 3 or 6 months    Recent Labs   Lab Test  08/16/17   1837   A1C  7.6*            Failed - Recent visit with authorizing provider's specialty in past 6 months    Patient had office visit in the last 6 months or has a visit in the next 30 days with authorizing provider.  See \"Patient Info\" tab in inbasket, or \"Choose Columns\" in Meds & Orders section of the refill encounter.           Passed - Blood pressure less than 140/90 in past 6 months    BP Readings from Last 3 Encounters:   08/16/17 130/80   04/12/17 120/78   11/30/16 138/88                Passed - LDL on file in past 12 months    Recent Labs   Lab Test  09/20/17   1614   LDL  99            Passed - Microalbumin on file in past 12 months    Recent Labs   Lab Test  08/16/17   1854   MICROL  <5   UMALCR  Unable to calculate due to low value            Passed - Serum creatinine on file in past 12 months    Recent Labs   Lab Test  04/12/17   1644   CR  0.84            Passed - Patient is age 18 or older          "

## 2018-02-22 NOTE — TELEPHONE ENCOUNTER
30 day supply given.  Patient is due for an OV to follow up on DM.  Please call and assist with scheduling appointment prior to next refill   Li Cruz RN - Triage  [facility]

## 2018-03-07 ENCOUNTER — OFFICE VISIT (OUTPATIENT)
Dept: FAMILY MEDICINE | Facility: CLINIC | Age: 33
End: 2018-03-07
Payer: COMMERCIAL

## 2018-03-07 VITALS
DIASTOLIC BLOOD PRESSURE: 86 MMHG | SYSTOLIC BLOOD PRESSURE: 138 MMHG | WEIGHT: 172 LBS | BODY MASS INDEX: 26.15 KG/M2 | TEMPERATURE: 97.8 F | HEART RATE: 80 BPM

## 2018-03-07 DIAGNOSIS — I10 BENIGN ESSENTIAL HYPERTENSION: ICD-10-CM

## 2018-03-07 DIAGNOSIS — F10.10 ALCOHOL ABUSE, EPISODIC DRINKING BEHAVIOR: Primary | ICD-10-CM

## 2018-03-07 DIAGNOSIS — E11.9 TYPE 2 DIABETES MELLITUS WITHOUT COMPLICATION, WITHOUT LONG-TERM CURRENT USE OF INSULIN (H): ICD-10-CM

## 2018-03-07 DIAGNOSIS — E78.5 HYPERLIPIDEMIA LDL GOAL <100: ICD-10-CM

## 2018-03-07 DIAGNOSIS — R07.0 THROAT PAIN: ICD-10-CM

## 2018-03-07 DIAGNOSIS — E78.1 HIGH BLOOD TRIGLYCERIDES: ICD-10-CM

## 2018-03-07 LAB — HBA1C MFR BLD: 7.2 % (ref 4.3–6)

## 2018-03-07 PROCEDURE — 80061 LIPID PANEL: CPT | Performed by: FAMILY MEDICINE

## 2018-03-07 PROCEDURE — 82043 UR ALBUMIN QUANTITATIVE: CPT | Performed by: FAMILY MEDICINE

## 2018-03-07 PROCEDURE — 80053 COMPREHEN METABOLIC PANEL: CPT | Performed by: FAMILY MEDICINE

## 2018-03-07 PROCEDURE — 99214 OFFICE O/P EST MOD 30 MIN: CPT | Performed by: FAMILY MEDICINE

## 2018-03-07 PROCEDURE — 83036 HEMOGLOBIN GLYCOSYLATED A1C: CPT | Performed by: FAMILY MEDICINE

## 2018-03-07 PROCEDURE — 36415 COLL VENOUS BLD VENIPUNCTURE: CPT | Performed by: FAMILY MEDICINE

## 2018-03-07 PROCEDURE — 84443 ASSAY THYROID STIM HORMONE: CPT | Performed by: FAMILY MEDICINE

## 2018-03-07 RX ORDER — PANTOPRAZOLE SODIUM 40 MG/1
40 TABLET, DELAYED RELEASE ORAL DAILY
Qty: 90 TABLET | Refills: 3 | Status: CANCELLED | OUTPATIENT
Start: 2018-03-07

## 2018-03-07 RX ORDER — METOPROLOL SUCCINATE 25 MG/1
25 TABLET, EXTENDED RELEASE ORAL DAILY
Qty: 90 TABLET | Refills: 1 | Status: CANCELLED | OUTPATIENT
Start: 2018-03-07

## 2018-03-07 RX ORDER — LOSARTAN POTASSIUM 25 MG/1
TABLET ORAL
Qty: 45 TABLET | Refills: 1 | Status: CANCELLED | OUTPATIENT
Start: 2018-03-07

## 2018-03-07 RX ORDER — FENOFIBRATE 54 MG/1
54 TABLET ORAL DAILY
Qty: 30 TABLET | Refills: 6 | Status: CANCELLED | OUTPATIENT
Start: 2018-03-07

## 2018-03-07 RX ORDER — LOSARTAN POTASSIUM 25 MG/1
TABLET ORAL
Qty: 90 TABLET | Refills: 1 | Status: SHIPPED | OUTPATIENT
Start: 2018-03-07 | End: 2018-10-01

## 2018-03-07 NOTE — MR AVS SNAPSHOT
After Visit Summary   3/7/2018    Flakito Ureña    MRN: [id number]           Patient Information     Date Of Birth          1985        Visit Information        Provider Department      3/7/2018 6:00 PM Amari Garg MD [facility] [place]        Today's Diagnoses     Alcohol abuse, episodic drinking behavior    -  1    Hyperlipidemia LDL goal <100        High blood triglycerides        Type 2 diabetes mellitus without complication, without long-term current use of insulin (H)        Benign essential hypertension        Throat pain           Follow-ups after your visit        Additional Services     OTOLARYNGOLOGY REFERRAL       Your provider has referred you to: N: [facility] [phone]   [email or web address]    Please be aware that coverage of these services is subject to the terms and limitations of your health insurance plan.  Call member services at your health plan with any benefit or coverage questions.      Please bring the following with you to your appointment:    (1) Any X-Rays, CTs or MRIs which have been performed.  Contact the facility where they were done to arrange for  prior to your scheduled appointment.   (2) List of current medications  (3) This referral request   (4) Any documents/labs given to you for this referral                  Follow-up notes from your care team     Return in about 6 months (around 9/7/2018) for Diabeties check.      Who to contact     If you have questions or need follow up information about today's clinic visit or your schedule please contact [facility] [place] directly at [phone].  Normal or non-critical lab and imaging results will be communicated to you by MyChart, letter or phone within 4 business days after the clinic has received the results. If you do not hear from us within 7 days, please contact the clinic through MyChart or phone. If you have a critical or  abnormal lab result, we will notify you by phone as soon as possible.  Submit refill requests through [employer] or call your pharmacy and they will forward the refill request to us. Please allow 3 business days for your refill to be completed.          Additional Information About Your Visit        [employer] Information     [employer] gives you secure access to your electronic health record. If you see a primary care provider, you can also send messages to your care team and make appointments. If you have questions, please call your primary care clinic.  If you do not have a primary care provider, please call [phone] and they will assist you.        Care EveryWhere ID     This is your Care EveryWhere ID. This could be used by other organizations to access your [facility] medical records  [id number]        Your Vitals Were     Pulse Temperature BMI (Body Mass Index)             80 97.8  F (36.6  C) (Tympanic) 26.15 kg/m2          Blood Pressure from Last 3 Encounters:   03/07/18 138/86   08/16/17 130/80   04/12/17 120/78    Weight from Last 3 Encounters:   03/07/18 172 lb (78 kg)   08/16/17 173 lb (78.5 kg)   04/12/17 170 lb (77.1 kg)              We Performed the Following     Albumin Random Urine Quantitative with Creat Ratio     Comprehensive metabolic panel     HEMOGLOBIN A1C     Lipid panel reflex to direct LDL Fasting     OTOLARYNGOLOGY REFERRAL     TSH          Today's Medication Changes          These changes are accurate as of 3/7/18  6:50 PM.  If you have any questions, ask your nurse or doctor.               These medicines have changed or have updated prescriptions.        Dose/Directions    insulin glargine 100 UNIT/ML injection   Commonly known as:  LANTUS SOLOSTAR   This may have changed:  See the new instructions.   Used for:  Type 2 diabetes mellitus without complication, without long-term current use of insulin (H)   Changed by:  Amari Garg MD        Inject 15 units subcutaneOusly at bedtime and  incrEase to 2 units if morning blood sugar consistentl >150 over one week   Quantity:  3 mL   Refills:  3       losartan 25 MG tablet   Commonly known as:  COZAAR   This may have changed:  See the new instructions.   Used for:  Type 2 diabetes mellitus without complication, without long-term current use of insulin (H), Benign essential hypertension   Changed by:  Amari Garg MD        Take 1 tab daily   Quantity:  90 tablet   Refills:  1            Where to get your medicines      These medications were sent to [id number] - [place], [place] - [address]  [address] , [place] [address]     Phone:  [phone]     insulin glargine 100 UNIT/ML injection    losartan 25 MG tablet    metFORMIN 1000 MG tablet                Primary Care Provider Office Phone # Fax #    Amari Garg [phone]       [address]  [place] [address]        Equal Access to Services     [facility]: Hadii aad ku hadasho Soomaali, waaxda luqadaha, qaybta kaalmada adeegyada, waxay bennyin hayaan edward amaton . So [facility] [phone].    ATENCIÓN: Si habla español, tiene a escobar disposición servicios gratuitos de asistencia lingüística. [facility] [phone].    We comply with applicable federal civil rights laws and [place] laws. We do not discriminate on the basis of race, color, national origin, age, disability, sex, sexual orientation, or gender identity.            Thank you!     Thank you for choosing [facility] [place]  for your care. Our goal is always to provide you with excellent care. Hearing back from our patients is one way we can continue to improve our services. Please take a few minutes to complete the written survey that you may receive in the mail after your visit with us. Thank you!             Your Updated Medication List - Protect others around you: Learn how to safely use, store and throw away your medicines at www.disposemymeds.org.          This list is  accurate as of 3/7/18  6:50 PM.  Always use your most recent med list.                   Brand Name Dispense Instructions for use Diagnosis    blood glucose monitoring lancets     1 Box    Use to test blood sugar one times daily or as directed.  Ok to substitute alternative if insurance prefers.    Type 2 diabetes mellitus without complication, without long-term current use of insulin (H)       * blood glucose monitoring meter device kit     1 kit    Use to test blood sugar 1 times daily or as directed.  Ok to substitute alternative if insurance prefers.    Type 2 diabetes mellitus without complication, without long-term current use of insulin (H)       * blood glucose monitoring meter device kit     1 kit    Use to test blood sugars 1 times daily or as directed.    Type 2 diabetes mellitus without complication, without long-term current use of insulin (H)       * blood glucose monitoring test strip    ACCU-CHEK HILARIO PLUS    100 strip    Use to test blood sugar 1 times daily or as directed.  Ok to substitute alternative if insurance prefers.    Type 2 diabetes mellitus without complication, without long-term current use of insulin (H)       * blood glucose monitoring test strip    no brand specified    100 strip    Use to test blood sugars 1 times daily or as directed    Type 2 diabetes mellitus without complication, without long-term current use of insulin (H)       fenofibrate 54 MG tablet     30 tablet    Take 1 tablet (54 mg) by mouth daily    Hyperlipidemia LDL goal <100, High blood triglycerides       insulin glargine 100 UNIT/ML injection    LANTUS SOLOSTAR    3 mL    Inject 15 units subcutaneOusly at bedtime and incrEase to 2 units if morning blood sugar consistentl >150 over one week    Type 2 diabetes mellitus without complication, without long-term current use of insulin (H)       insulin pen needle 32G X 4 MM    BD ALEXANDRO U/F    100 each    Use 1 daily or as directed.    Type 2 diabetes mellitus without  complication, without long-term current use of insulin (H)       losartan 25 MG tablet    COZAAR    90 tablet    Take 1 tab daily    Type 2 diabetes mellitus without complication, without long-term current use of insulin (H), Benign essential hypertension       metFORMIN 1000 MG tablet    GLUCOPHAGE    180 tablet    Take 1 tablet (1,000 mg) by mouth 2 times daily (with meals)    Type 2 diabetes mellitus without complication, without long-term current use of insulin (H)       metoprolol succinate 25 MG 24 hr tablet    TOPROL-XL    90 tablet    TAKE ONE TABLET BY MOUTH ONE TIME DAILY    Benign essential hypertension       pantoprazole 40 MG EC tablet    PROTONIX    90 tablet    Take 1 tablet (40 mg) by mouth daily    Type 2 diabetes mellitus without complication, without long-term current use of insulin (H)       VITAMIN D (CHOLECALCIFEROL) PO      Take by mouth daily        * Notice:  This list has 4 medication(s) that are the same as other medications prescribed for you. Read the directions carefully, and ask your doctor or other care provider to review them with you.

## 2018-03-08 LAB
ALBUMIN SERPL-MCNC: 4.4 G/DL (ref 3.4–5)
ALP SERPL-CCNC: 90 U/L (ref 40–150)
ALT SERPL W P-5'-P-CCNC: 99 U/L (ref 0–70)
ANION GAP SERPL CALCULATED.3IONS-SCNC: 8 MMOL/L (ref 3–14)
AST SERPL W P-5'-P-CCNC: 66 U/L (ref 0–45)
BILIRUB SERPL-MCNC: 0.4 MG/DL (ref 0.2–1.3)
BUN SERPL-MCNC: 4 MG/DL (ref 7–30)
CALCIUM SERPL-MCNC: 9.6 MG/DL (ref 8.5–10.1)
CHLORIDE SERPL-SCNC: 99 MMOL/L (ref 94–109)
CHOLEST SERPL-MCNC: 184 MG/DL
CO2 SERPL-SCNC: 29 MMOL/L (ref 20–32)
CREAT SERPL-MCNC: 0.84 MG/DL (ref 0.66–1.25)
CREAT UR-MCNC: 14 MG/DL
GFR SERPL CREATININE-BSD FRML MDRD: >90 ML/MIN/1.7M2
GLUCOSE SERPL-MCNC: 206 MG/DL (ref 70–99)
HDLC SERPL-MCNC: 37 MG/DL
LDLC SERPL CALC-MCNC: 79 MG/DL
MICROALBUMIN UR-MCNC: 5 MG/L
MICROALBUMIN/CREAT UR: 37.38 MG/G CR (ref 0–17)
NONHDLC SERPL-MCNC: 147 MG/DL
POTASSIUM SERPL-SCNC: 4.2 MMOL/L (ref 3.4–5.3)
PROT SERPL-MCNC: 7.9 G/DL (ref 6.8–8.8)
SODIUM SERPL-SCNC: 136 MMOL/L (ref 133–144)
TRIGL SERPL-MCNC: 339 MG/DL
TSH SERPL DL<=0.005 MIU/L-ACNC: 0.83 MU/L (ref 0.4–4)

## 2018-03-08 NOTE — PROGRESS NOTES
SUBJECTIVE:   Flakito Ureña is a 32 year old male who presents to clinic today for the following health issues:      Diabetes Follow-up  On lantus and metformin    Patient is checking blood sugars: not at all    Diabetic concerns: None     Symptoms of hypoglycemia (low blood sugar): shaky, dizzy when he has not eaten or hard exercise     Paresthesias (numbness or burning in feet) or sores: Yes occasionally     Date of last diabetic eye exam: NONE    BP Readings from Last 2 Encounters:   03/07/18 152/90   08/16/17 130/80     Hemoglobin A1C (%)   Date Value   08/16/2017 7.6 (H)   04/12/2017 8.8 (H)     LDL Cholesterol Calculated (mg/dL)   Date Value   09/20/2017 99   12/02/2016     Cannot estimate LDL when triglyceride exceeds 400 mg/dL       Amount of exercise or physical activity: None    Problems taking medications regularly: No    Medication side effects: none    Diet: regular (no restrictions)            Problem list and histories reviewed & adjusted, as indicated.  Additional history: as documented    Patient Active Problem List   Diagnosis     Benign essential hypertension     Hyperlipidemia LDL goal <100     Pulmonary TB treated in 2009     Alcohol abuse, episodic drinking behavior     Type 2 diabetes mellitus without complication, with long-term current use of insulin (H)     Past Surgical History:   Procedure Laterality Date     THROAT SURGERY      vocal cord surgery       Social History   Substance Use Topics     Smoking status: Former Smoker     Smokeless tobacco: Never Used     Alcohol use 0.0 oz/week     0 Standard drinks or equivalent per week     Family History   Problem Relation Age of Onset     DIABETES Father      DIABETES Mother      DIABETES Brother          Current Outpatient Prescriptions   Medication Sig Dispense Refill     insulin glargine (LANTUS SOLOSTAR) 100 UNIT/ML injection Inject 15 units subcutaneOusly at bedtime and incrEase to 2 units if morning blood sugar consistentl >150 over  one week 3 mL 3     metFORMIN (GLUCOPHAGE) 1000 MG tablet Take 1 tablet (1,000 mg) by mouth 2 times daily (with meals) 180 tablet 1     losartan (COZAAR) 25 MG tablet Take 1 tab daily 90 tablet 1     metoprolol (TOPROL-XL) 25 MG 24 hr tablet TAKE ONE TABLET BY MOUTH ONE TIME DAILY  90 tablet 1     fenofibrate 54 MG tablet Take 1 tablet (54 mg) by mouth daily 30 tablet 6     pantoprazole (PROTONIX) 40 MG EC tablet Take 1 tablet (40 mg) by mouth daily 90 tablet 3     VITAMIN D, CHOLECALCIFEROL, PO Take by mouth daily       blood glucose monitoring (NO BRAND SPECIFIED) test strip Use to test blood sugars 1 times daily or as directed 100 strip 11     blood glucose monitoring (ONE TOUCH ULTRA 2) meter device kit Use to test blood sugars 1 times daily or as directed. 1 kit 0     insulin pen needle (BD ALEXANDRO U/F) 32G X 4 MM Use 1 daily or as directed. 100 each prn     blood glucose monitoring (ACCU-CHEK HILARIO PLUS) test strip Use to test blood sugar 1 times daily or as directed.  Ok to substitute alternative if insurance prefers. 100 strip prn     blood glucose monitoring (ACCU-CHEK HILARIO PLUS) meter device kit Use to test blood sugar 1 times daily or as directed.  Ok to substitute alternative if insurance prefers. 1 kit 0     blood glucose monitoring (SOFTCLIX) lancets Use to test blood sugar one times daily or as directed.  Ok to substitute alternative if insurance prefers. 1 Box prn       Reviewed and updated as needed this visit by clinical staff  Tobacco  Allergies  Meds       Reviewed and updated as needed this visit by Provider         ROS:  CONSTITUTIONAL: NEGATIVE for fever, chills, change in weight  ENT/MOUTH: NEGATIVE for ear, mouth and throat problems  RESP: NEGATIVE for significant cough or SOB  CV: NEGATIVE for chest pain, palpitations or peripheral edema    OBJECTIVE:                                                    /86  Pulse 80  Temp 97.8  F (36.6  C) (Tympanic)  Wt 172 lb (78 kg)  BMI 26.15  kg/m2  Body mass index is 26.15 kg/(m^2).   GENERAL: healthy, alert, well nourished, well hydrated, no distress  HENT: ear canals- normal; TMs- normal; Nose- normal; Mouth- no ulcers, no lesions  NECK: no tenderness, no adenopathy, no asymmetry, no masses, no stiffness; thyroid- normal to palpation  RESP: lungs clear to auscultation - no rales, no rhonchi, no wheezes  CV: regular rates and rhythm, normal S1 S2, no S3 or S4 and no murmur, no click or rub -  ABDOMEN: soft, no tenderness, no  hepatosplenomegaly, no masses, normal bowel sounds         ASSESSMENT/PLAN:                                                        ICD-10-CM    1. Alcohol abuse, episodic drinking behavior F10.10    2. Hyperlipidemia LDL goal <100 E78.5 Comprehensive metabolic panel     Lipid panel reflex to direct LDL Fasting   3. High blood triglycerides E78.1 Lipid panel reflex to direct LDL Fasting   4. Type 2 diabetes mellitus without complication, without long-term current use of insulin (H) E11.9 HEMOGLOBIN A1C     TSH     Albumin Random Urine Quantitative with Creat Ratio     insulin glargine (LANTUS SOLOSTAR) 100 UNIT/ML injection     metFORMIN (GLUCOPHAGE) 1000 MG tablet     losartan (COZAAR) 25 MG tablet   5. Benign essential hypertension I10 Comprehensive metabolic panel     losartan (COZAAR) 25 MG tablet   6. Throat pain R07.0 OTOLARYNGOLOGY REFERRAL     Labs ordered will follow up on that.  Blood sugar well controled, continue the same dose of medication.  He has very high triglycerides, started him on fenofibrate in the past, will check the labs and adjust the dose as required.  Dicussed alcohol intake, he is motivated to cut it down.  BP upper normal advice to take whole tab of 25 mg of losartan instead of 12.5 mg.  Follow up 6 months or sooner if nay issues.    Amari Garg MD  [facility] [place]

## 2018-03-08 NOTE — NURSING NOTE
"Chief Complaint   Patient presents with     Diabetes     Not Fasting        Initial /90  Pulse 80  Temp 97.8  F (36.6  C) (Tympanic)  Wt 172 lb (78 kg)  BMI 26.15 kg/m2 Estimated body mass index is 26.15 kg/(m^2) as calculated from the following:    Height as of 4/12/17: 5' 8\" (1.727 m).    Weight as of this encounter: 172 lb (78 kg).  Medication Reconciliation: complete    Current Outpatient Prescriptions   Medication Sig Dispense Refill     LANTUS SOLOSTAR 100 UNIT/ML soln Inject 15 units subcutaneOusly at bedtime and incrEase to 2 units if morninG blood sugar consistentlY >150 over one week 3 mL 0     losartan (COZAAR) 25 MG tablet TAKE 0.5 TABLET BY MOUTH A DAY 45 tablet 1     metoprolol (TOPROL-XL) 25 MG 24 hr tablet TAKE ONE TABLET BY MOUTH ONE TIME DAILY  90 tablet 1     fenofibrate 54 MG tablet Take 1 tablet (54 mg) by mouth daily 30 tablet 6     metFORMIN (GLUCOPHAGE) 1000 MG tablet Take 1 tablet (1,000 mg) by mouth 2 times daily (with meals) 180 tablet 1     pantoprazole (PROTONIX) 40 MG EC tablet Take 1 tablet (40 mg) by mouth daily 90 tablet 3     VITAMIN D, CHOLECALCIFEROL, PO Take by mouth daily       blood glucose monitoring (NO BRAND SPECIFIED) test strip Use to test blood sugars 1 times daily or as directed 100 strip 11     blood glucose monitoring (ONE TOUCH ULTRA 2) meter device kit Use to test blood sugars 1 times daily or as directed. 1 kit 0     insulin pen needle (BD ALEXANDRO U/F) 32G X 4 MM Use 1 daily or as directed. 100 each prn     blood glucose monitoring (ACCU-CHEK HILARIO PLUS) test strip Use to test blood sugar 1 times daily or as directed.  Ok to substitute alternative if insurance prefers. 100 strip prn     blood glucose monitoring (ACCU-CHEK HILARIO PLUS) meter device kit Use to test blood sugar 1 times daily or as directed.  Ok to substitute alternative if insurance prefers. 1 kit 0     blood glucose monitoring (SOFTCLIX) lancets Use to test blood sugar one times daily or as " directed.  Ok to substitute alternative if insurance prefers. 1 Box prn       Panda CORNEJO, CMA

## 2018-04-23 DIAGNOSIS — E11.9 TYPE 2 DIABETES MELLITUS WITHOUT COMPLICATION, WITHOUT LONG-TERM CURRENT USE OF INSULIN (H): ICD-10-CM

## 2018-04-23 RX ORDER — PANTOPRAZOLE SODIUM 40 MG/1
TABLET, DELAYED RELEASE ORAL
Qty: 30 TABLET | Refills: 10 | Status: SHIPPED | OUTPATIENT
Start: 2018-04-23 | End: 2019-07-18

## 2018-04-23 NOTE — TELEPHONE ENCOUNTER
"Requested Prescriptions   Pending Prescriptions Disp Refills     pantoprazole (PROTONIX) 40 MG EC tablet [Pharmacy Med Name: Pantoprazole Sodium Oral Tablet Delayed Release 40 MG] 30 tablet 2    Last Written Prescription Date:  4/13/17  Last Fill Quantity: 90,  # refills: 3   Last office visit: 3/7/2018 with prescribing provider:  3/7/18   Future Office Visit:     Sig: TAKE ONE TABLET BY MOUTH ONE TIME DAILY    PPI Protocol Passed    4/23/2018  2:05 PM       Passed - Not on Clopidogrel (unless Pantoprazole ordered)       Passed - No diagnosis of osteoporosis on record       Passed - Recent (12 mo) or future (30 days) visit within the authorizing provider's specialty    Patient had office visit in the last 12 months or has a visit in the next 30 days with authorizing provider or within the authorizing provider's specialty.  See \"Patient Info\" tab in inbasket, or \"Choose Columns\" in Meds & Orders section of the refill encounter.           Passed - Patient is age 18 or older          "

## 2018-05-30 DIAGNOSIS — I10 BENIGN ESSENTIAL HYPERTENSION: ICD-10-CM

## 2018-05-30 NOTE — TELEPHONE ENCOUNTER
"Requested Prescriptions   Pending Prescriptions Disp Refills     metoprolol succinate (TOPROL-XL) 25 MG 24 hr tablet [Pharmacy Med Name: Metoprolol Succinate ER Oral Tablet Extended Release 24 Hour 25 MG] 30 tablet 0    Last Written Prescription Date:  12/11/17  Last Fill Quantity: 90,  # refills: 1   Last office visit: 3/7/2018 with prescribing provider:  YES   Future Office Visit:     Sig: TAKE ONE TABLET BY MOUTH ONE TIME DAILY    Beta-Blockers Protocol Passed    5/30/2018  7:01 AM       Passed - Blood pressure under 140/90 in past 12 months    BP Readings from Last 3 Encounters:   03/07/18 138/86   08/16/17 130/80   04/12/17 120/78                Passed - Patient is age 6 or older       Passed - Recent (12 mo) or future (30 days) visit within the authorizing provider's specialty    Patient had office visit in the last 12 months or has a visit in the next 30 days with authorizing provider or within the authorizing provider's specialty.  See \"Patient Info\" tab in inbasket, or \"Choose Columns\" in Meds & Orders section of the refill encounter.              "

## 2018-05-31 RX ORDER — METOPROLOL SUCCINATE 25 MG/1
TABLET, EXTENDED RELEASE ORAL
Qty: 90 TABLET | Refills: 0 | Status: SHIPPED | OUTPATIENT
Start: 2018-05-31 | End: 2018-09-01

## 2018-05-31 NOTE — TELEPHONE ENCOUNTER
Medication is being filled for 1 time refill only due to:    Patient is to f/u in Sept for 6 month check    Maya Lackey RN- Triage [employer]

## 2018-06-15 DIAGNOSIS — E11.9 TYPE 2 DIABETES MELLITUS WITHOUT COMPLICATION, WITHOUT LONG-TERM CURRENT USE OF INSULIN (H): ICD-10-CM

## 2018-06-15 RX ORDER — INSULIN GLARGINE 100 [IU]/ML
INJECTION, SOLUTION SUBCUTANEOUS
Qty: 3 ML | Refills: 0 | OUTPATIENT
Start: 2018-06-15

## 2018-06-15 NOTE — TELEPHONE ENCOUNTER
"Requested Prescriptions   Pending Prescriptions Disp Refills     LANTUS SOLOSTAR 100 UNIT/ML soln [Pharmacy Med Name: Lantus SoloStar Subcutaneous Solution Pen-injector 100 UNIT/ML]  Medication may not be due for a refill.  Last Written Prescription Date:  6/4/2018  Last Fill Quantity: 3 mL,  # refills: 0   Last office visit: 3/7/2018 with prescribing provider:  [facility]   Future Office Visit:       3 mL 0     Sig: Inject 15 units subcutaneously at bedtime and increase to 2 units if morning blood sugar consistentl >150 over one week    Long Acting Insulin Protocol Passed    6/15/2018  7:03 AM       Passed - Blood pressure less than 140/90 in past 6 months    BP Readings from Last 3 Encounters:   03/07/18 138/86   08/16/17 130/80   04/12/17 120/78              Passed - LDL on file in past 12 months    Recent Labs   Lab Test  03/07/18   1832   LDL  79            Passed - Microalbumin on file in past 12 months    Recent Labs   Lab Test  03/07/18   1837   MICROL  5   UMALCR  37.38*            Passed - Serum creatinine on file in past 12 months    Recent Labs   Lab Test  03/07/18   1832   CR  0.84            Passed - HgbA1C in past 3 or 6 months    If HgbA1C is 8 or greater, it needs to be on file within the past 3 months.  If less than 8, must be on file within the past 6 months.     Recent Labs   Lab Test  03/07/18   1832   A1C  7.2*            Passed - Patient is age 18 or older       Passed - Recent (6 mo) or future (30 days) visit within the authorizing provider's specialty    Patient had office visit in the last 6 months or has a visit in the next 30 days with authorizing provider or within the authorizing provider's specialty.  See \"Patient Info\" tab in inbasket, or \"Choose Columns\" in Meds & Orders section of the refill encounter.              "

## 2018-07-03 DIAGNOSIS — E11.9 TYPE 2 DIABETES MELLITUS WITHOUT COMPLICATION, WITHOUT LONG-TERM CURRENT USE OF INSULIN (H): ICD-10-CM

## 2018-07-05 RX ORDER — INSULIN GLARGINE 100 [IU]/ML
INJECTION, SOLUTION SUBCUTANEOUS
Qty: 9 ML | Refills: 0 | Status: SHIPPED | OUTPATIENT
Start: 2018-07-05 | End: 2018-10-16

## 2018-07-05 NOTE — TELEPHONE ENCOUNTER
Prescription approved per [facility], [facility] or [facility] refill protocol.  Li Cruz RN - Triage  [facility]

## 2018-07-05 NOTE — TELEPHONE ENCOUNTER
"Requested Prescriptions   Pending Prescriptions Disp Refills     LANTUS SOLOSTAR 100 UNIT/ML soln [Pharmacy Med Name: Lantus SoloStar Subcutaneous Solution Pen-injector 100 UNIT/ML]  Last Written Prescription Date:  6/4/18  Last Fill Quantity: 3mL,  # refills: 0   Last office visit: 3/7/2018 with prescribing provider:  yamileth   Future Office Visit:     3 mL 0     Sig: Inject 15 units subcutaneously at bedtime and increase to 2 units if morning blood sugar consistentl >150 over one week    Long Acting Insulin Protocol Passed    7/3/2018  7:24 PM       Passed - Blood pressure less than 140/90 in past 6 months    BP Readings from Last 3 Encounters:   03/07/18 138/86   08/16/17 130/80   04/12/17 120/78                Passed - LDL on file in past 12 months    Recent Labs   Lab Test  03/07/18   1832   LDL  79            Passed - Microalbumin on file in past 12 months    Recent Labs   Lab Test  03/07/18   1837   MICROL  5   UMALCR  37.38*            Passed - Serum creatinine on file in past 12 months    Recent Labs   Lab Test  03/07/18   1832   CR  0.84            Passed - HgbA1C in past 3 or 6 months    If HgbA1C is 8 or greater, it needs to be on file within the past 3 months.  If less than 8, must be on file within the past 6 months.     Recent Labs   Lab Test  03/07/18   1832   A1C  7.2*            Passed - Patient is age 18 or older       Passed - Recent (6 mo) or future (30 days) visit within the authorizing provider's specialty    Patient had office visit in the last 6 months or has a visit in the next 30 days with authorizing provider or within the authorizing provider's specialty.  See \"Patient Info\" tab in inbasket, or \"Choose Columns\" in Meds & Orders section of the refill encounter.              "

## 2018-07-09 DIAGNOSIS — E11.9 TYPE 2 DIABETES MELLITUS WITHOUT COMPLICATION, WITHOUT LONG-TERM CURRENT USE OF INSULIN (H): ICD-10-CM

## 2018-07-10 RX ORDER — PEN NEEDLE, DIABETIC 32GX 5/32"
NEEDLE, DISPOSABLE MISCELLANEOUS
Qty: 100 EACH | Status: SHIPPED | OUTPATIENT
Start: 2018-07-10 | End: 2019-10-31

## 2018-07-10 NOTE — TELEPHONE ENCOUNTER
"Requested Prescriptions   Pending Prescriptions Disp Refills     BD ALEXANDRO U/F 32G X 4 MM insulin pen needle [Pharmacy Med Name: BD Pen Needle Alexandro U/F Miscellaneous 32G X 4 MM] 100 each PRN    Last Written Prescription Date:  04/13/2017  Last Fill Quantity: 100 each,  # refills: prn   Last office visit: 3/7/2018 with prescribing provider:  Amari Garg   Future Office Visit:     Sig: USE ONCE DAILY    Diabetic Supplies Protocol Passed    7/9/2018  7:51 PM       Passed - Patient is 18 years of age or older       Passed - Recent (6 mo) or future (30 days) visit within the authorizing provider's specialty    Patient had office visit in the last 6 months or has a visit in the next 30 days with authorizing provider.  See \"Patient Info\" tab in inbasket, or \"Choose Columns\" in Meds & Orders section of the refill encounter.              "

## 2018-09-01 DIAGNOSIS — I10 BENIGN ESSENTIAL HYPERTENSION: ICD-10-CM

## 2018-09-02 RX ORDER — METOPROLOL SUCCINATE 25 MG/1
TABLET, EXTENDED RELEASE ORAL
Qty: 30 TABLET | Refills: 0 | Status: SHIPPED | OUTPATIENT
Start: 2018-09-02 | End: 2018-10-01

## 2018-09-02 NOTE — TELEPHONE ENCOUNTER
"Ok x one- has appointment pending  Jackie EnriquezRN BSN  [facility]  [phone]      Last Written Prescription Date:  05/31/1/8  Last Fill Quantity: 90,  # refills: 0  Last office visit: 3/7/2018 with prescribing provider:  yes   Future Office Visit:   Next 5 appointments (look out 90 days)     Sep 04, 2018  9:20 AM CDT   SHORT with Amari Garg MD   [facility] ([address] [address]   [phone]                 Requested Prescriptions   Pending Prescriptions Disp Refills     metoprolol succinate (TOPROL-XL) 25 MG 24 hr tablet [Pharmacy Med Name: Metoprolol Succinate ER Oral Tablet Extended Release 24 Hour 25 MG] 30 tablet 0     Sig: TAKE ONE TABLET BY MOUTH ONE TIME DAILY    Beta-Blockers Protocol Passed    9/1/2018  7:01 AM       Passed - Blood pressure under 140/90 in past 12 months    BP Readings from Last 3 Encounters:   03/07/18 138/86   08/16/17 130/80   04/12/17 120/78                Passed - Patient is age 6 or older       Passed - Recent (12 mo) or future (30 days) visit within the authorizing provider's specialty    Patient had office visit in the last 12 months or has a visit in the next 30 days with authorizing provider or within the authorizing provider's specialty.  See \"Patient Info\" tab in inbasket, or \"Choose Columns\" in Meds & Orders section of the refill encounter.              "

## 2018-10-01 DIAGNOSIS — E11.9 TYPE 2 DIABETES MELLITUS WITHOUT COMPLICATION, WITHOUT LONG-TERM CURRENT USE OF INSULIN (H): ICD-10-CM

## 2018-10-01 DIAGNOSIS — I10 BENIGN ESSENTIAL HYPERTENSION: ICD-10-CM

## 2018-10-01 RX ORDER — METOPROLOL SUCCINATE 25 MG/1
TABLET, EXTENDED RELEASE ORAL
Qty: 30 TABLET | Refills: 6 | Status: SHIPPED | OUTPATIENT
Start: 2018-10-01 | End: 2019-05-26

## 2018-10-01 RX ORDER — LOSARTAN POTASSIUM 25 MG/1
TABLET ORAL
Qty: 30 TABLET | Refills: 6 | Status: SHIPPED | OUTPATIENT
Start: 2018-10-01 | End: 2019-05-26

## 2018-10-01 NOTE — TELEPHONE ENCOUNTER
"Requested Prescriptions   Pending Prescriptions Disp Refills     losartan (COZAAR) 25 MG tablet [Pharmacy Med Name: Losartan Potassium Oral Tablet 25 MG]  Last Written Prescription Date:  3/7/18  Last Fill Quantity: 90,  # refills: 1   Last office visit: 3/7/2018 with prescribing provider:  3/7/18   Future Office Visit:     30 tablet 0     Sig: Take 1 tablet by mouth daily    Angiotensin-II Receptors Passed    10/1/2018  7:02 AM       Passed - Blood pressure under 140/90 in past 12 months    BP Readings from Last 3 Encounters:   03/07/18 138/86   08/16/17 130/80   04/12/17 120/78                Passed - Recent (12 mo) or future (30 days) visit within the authorizing provider's specialty    Patient had office visit in the last 12 months or has a visit in the next 30 days with authorizing provider or within the authorizing provider's specialty.  See \"Patient Info\" tab in inbasket, or \"Choose Columns\" in Meds & Orders section of the refill encounter.           Passed - Patient is age 18 or older       Passed - Normal serum creatinine on file in past 12 months    Recent Labs   Lab Test  03/07/18   1832   CR  0.84            Passed - Normal serum potassium on file in past 12 months    Recent Labs   Lab Test  03/07/18   1832   POTASSIUM  4.2                    metoprolol succinate (TOPROL-XL) 25 MG 24 hr tablet [Pharmacy Med Name: Metoprolol Succinate ER Oral Tablet Extended Release 24 Hour 25  Last Written Prescription Date:  9/2/18  Last Fill Quantity: 30,  # refills: 0   Last office visit: 3/7/2018 with prescribing provider:  3/7/18   Future Office Visit:     MG] 30 tablet 0     Sig: TAKE ONE TABLET BY MOUTH ONE TIME DAILY    Beta-Blockers Protocol Passed    10/1/2018  7:02 AM       Passed - Blood pressure under 140/90 in past 12 months    BP Readings from Last 3 Encounters:   03/07/18 138/86   08/16/17 130/80   04/12/17 120/78                Passed - Patient is age 6 or older       Passed - Recent (12 mo) or future " "(30 days) visit within the authorizing provider's specialty    Patient had office visit in the last 12 months or has a visit in the next 30 days with authorizing provider or within the authorizing provider's specialty.  See \"Patient Info\" tab in inbasket, or \"Choose Columns\" in Meds & Orders section of the refill encounter.              "

## 2018-10-16 ENCOUNTER — OFFICE VISIT (OUTPATIENT)
Dept: FAMILY MEDICINE | Facility: CLINIC | Age: 33
End: 2018-10-16
Payer: COMMERCIAL

## 2018-10-16 VITALS
HEIGHT: 67 IN | DIASTOLIC BLOOD PRESSURE: 80 MMHG | BODY MASS INDEX: 26.84 KG/M2 | TEMPERATURE: 98.1 F | HEART RATE: 88 BPM | WEIGHT: 171 LBS | SYSTOLIC BLOOD PRESSURE: 124 MMHG

## 2018-10-16 DIAGNOSIS — N50.819 TESTICULAR DISCOMFORT: ICD-10-CM

## 2018-10-16 DIAGNOSIS — Z79.4 TYPE 2 DIABETES MELLITUS WITHOUT COMPLICATION, WITH LONG-TERM CURRENT USE OF INSULIN (H): ICD-10-CM

## 2018-10-16 DIAGNOSIS — Z11.4 SCREENING FOR HIV (HUMAN IMMUNODEFICIENCY VIRUS): ICD-10-CM

## 2018-10-16 DIAGNOSIS — I10 BENIGN ESSENTIAL HYPERTENSION: ICD-10-CM

## 2018-10-16 DIAGNOSIS — Z13.89 SCREENING FOR DIABETIC PERIPHERAL NEUROPATHY: ICD-10-CM

## 2018-10-16 DIAGNOSIS — E11.9 TYPE 2 DIABETES MELLITUS WITHOUT COMPLICATION, WITHOUT LONG-TERM CURRENT USE OF INSULIN (H): ICD-10-CM

## 2018-10-16 DIAGNOSIS — Z00.00 ENCOUNTER FOR ANNUAL PHYSICAL EXAM: ICD-10-CM

## 2018-10-16 DIAGNOSIS — F10.10 ALCOHOL ABUSE, EPISODIC DRINKING BEHAVIOR: ICD-10-CM

## 2018-10-16 DIAGNOSIS — E11.9 TYPE 2 DIABETES MELLITUS WITHOUT COMPLICATION, WITH LONG-TERM CURRENT USE OF INSULIN (H): ICD-10-CM

## 2018-10-16 DIAGNOSIS — Z23 NEED FOR PROPHYLACTIC VACCINATION AND INOCULATION AGAINST INFLUENZA: Primary | ICD-10-CM

## 2018-10-16 LAB
ALBUMIN SERPL-MCNC: 3.9 G/DL (ref 3.4–5)
ALP SERPL-CCNC: 55 U/L (ref 40–150)
ALT SERPL W P-5'-P-CCNC: 57 U/L (ref 0–70)
ANION GAP SERPL CALCULATED.3IONS-SCNC: 8 MMOL/L (ref 3–14)
AST SERPL W P-5'-P-CCNC: 45 U/L (ref 0–45)
BILIRUB SERPL-MCNC: 0.9 MG/DL (ref 0.2–1.3)
BUN SERPL-MCNC: 8 MG/DL (ref 7–30)
CALCIUM SERPL-MCNC: 9.4 MG/DL (ref 8.5–10.1)
CHLORIDE SERPL-SCNC: 102 MMOL/L (ref 94–109)
CO2 SERPL-SCNC: 29 MMOL/L (ref 20–32)
CREAT SERPL-MCNC: 0.75 MG/DL (ref 0.66–1.25)
GFR SERPL CREATININE-BSD FRML MDRD: >90 ML/MIN/1.7M2
GLUCOSE SERPL-MCNC: 136 MG/DL (ref 70–99)
HBA1C MFR BLD: 7.3 % (ref 0–5.6)
HIV 1+2 AB+HIV1 P24 AG SERPL QL IA: NONREACTIVE
POTASSIUM SERPL-SCNC: 4.5 MMOL/L (ref 3.4–5.3)
PROT SERPL-MCNC: 7.7 G/DL (ref 6.8–8.8)
SODIUM SERPL-SCNC: 139 MMOL/L (ref 133–144)

## 2018-10-16 PROCEDURE — 99207 C FOOT EXAM  NO CHARGE: CPT | Mod: 25 | Performed by: FAMILY MEDICINE

## 2018-10-16 PROCEDURE — 80053 COMPREHEN METABOLIC PANEL: CPT | Performed by: FAMILY MEDICINE

## 2018-10-16 PROCEDURE — 36415 COLL VENOUS BLD VENIPUNCTURE: CPT | Performed by: FAMILY MEDICINE

## 2018-10-16 PROCEDURE — 90471 IMMUNIZATION ADMIN: CPT | Performed by: FAMILY MEDICINE

## 2018-10-16 PROCEDURE — 99213 OFFICE O/P EST LOW 20 MIN: CPT | Mod: 25 | Performed by: FAMILY MEDICINE

## 2018-10-16 PROCEDURE — 83036 HEMOGLOBIN GLYCOSYLATED A1C: CPT | Performed by: FAMILY MEDICINE

## 2018-10-16 PROCEDURE — 87389 HIV-1 AG W/HIV-1&-2 AB AG IA: CPT | Performed by: FAMILY MEDICINE

## 2018-10-16 PROCEDURE — 90686 IIV4 VACC NO PRSV 0.5 ML IM: CPT | Performed by: FAMILY MEDICINE

## 2018-10-16 PROCEDURE — 99395 PREV VISIT EST AGE 18-39: CPT | Mod: 25 | Performed by: FAMILY MEDICINE

## 2018-10-16 NOTE — MR AVS SNAPSHOT
After Visit Summary   10/16/2018    Flakito Ureña    MRN: [id number]           Patient Information     Date Of Birth          1985        Visit Information        Provider Department      10/16/2018 9:40 AM Amari Garg [place] [facility] [place]        Today's Diagnoses     Need for prophylactic vaccination and inoculation against influenza    -  1    Encounter for annual physical exam        Screening for HIV (human immunodeficiency virus)        Screening for diabetic peripheral neuropathy        Alcohol abuse, episodic drinking behavior        Benign essential hypertension        Type 2 diabetes mellitus without complication, with long-term current use of insulin (H)        Type 2 diabetes mellitus without complication, without long-term current use of insulin (H)        Testicular discomfort          Care Instructions      Preventive Health Recommendations  Male Ages 26 - 39    Yearly exam:             See your health care provider every year in order to  o   Review health changes.   o   Discuss preventive care.    o   Review your medicines if your doctor has prescribed any.    You should be tested each year for STDs (sexually transmitted diseases), if you re at risk.     After age 35, talk to your provider about cholesterol testing. If you are at risk for heart disease, have your cholesterol tested at least every 5 years.     If you are at risk for diabetes, you should have a diabetes test (fasting glucose).  Shots: Get a flu shot each year. Get a tetanus shot every 10 years.     Nutrition:    Eat at least 5 servings of fruits and vegetables daily.     Eat whole-grain bread, whole-wheat pasta and brown rice instead of white grains and rice.     Get adequate Calcium and Vitamin D.     Lifestyle    Exercise for at least 150 minutes a week (30 minutes a day, 5 days a week). This will help you control your weight and prevent disease.     Limit alcohol to one drink per day.     No  "smoking.     Wear sunscreen to prevent skin cancer.     See your dentist every six months for an exam and cleaning.             Follow-ups after your visit        Follow-up notes from your care team     Return in about 6 months (around 4/16/2019) for Diabeties check.      Future tests that were ordered for you today     Open Future Orders        Priority Expected Expires Ordered    US Testicular & Scrotum w Doppler Ltd Routine  10/16/2019 10/16/2018            Who to contact     If you have questions or need follow up information about today's clinic visit or your schedule please contact [facility] [place] directly at [phone].  Normal or non-critical lab and imaging results will be communicated to you by MyChart, letter or phone within 4 business days after the clinic has received the results. If you do not hear from us within 7 days, please contact the clinic through Viroolhart or phone. If you have a critical or abnormal lab result, we will notify you by phone as soon as possible.  Submit refill requests through Tellja or call your pharmacy and they will forward the refill request to us. Please allow 3 business days for your refill to be completed.          Additional Information About Your Visit        MyChart Information     Tellja gives you secure access to your electronic health record. If you see a primary care provider, you can also send messages to your care team and make appointments. If you have questions, please call your primary care clinic.  If you do not have a primary care provider, please call [phone] and they will assist you.        Care EveryWhere ID     This is your Care EveryWhere ID. This could be used by other organizations to access your [place] medical records  [id number]        Your Vitals Were     Pulse Temperature Height BMI (Body Mass Index)          88 98.1  F (36.7  C) (Tympanic) 5' 7.13\" (1.705 m) 26.68 kg/m2         Blood Pressure from Last 3 Encounters: "   10/16/18 124/80   03/07/18 138/86   08/16/17 130/80    Weight from Last 3 Encounters:   10/16/18 171 lb (77.6 kg)   03/07/18 172 lb (78 kg)   08/16/17 173 lb (78.5 kg)              We Performed the Following     Comprehensive metabolic panel     FLU VACCINE, SPLIT VIRUS, IM (QUADRIVALENT) [63047]- >3 YRS     FOOT EXAM  NO CHARGE [93550.114]     HEMOGLOBIN A1C     HIV Screening     Vaccine Administration, Initial [49785]          Today's Medication Changes          These changes are accurate as of 10/16/18 11:06 AM.  If you have any questions, ask your nurse or doctor.               These medicines have changed or have updated prescriptions.        Dose/Directions    LANTUS SOLOSTAR 100 UNIT/ML injection   This may have changed:  Another medication with the same name was removed. Continue taking this medication, and follow the directions you see here.   Used for:  Type 2 diabetes mellitus without complication, without long-term current use of insulin (H)   Generic drug:  insulin glargine   Changed by:  Amari Garg MD        Inject 15 units subcutaneously at bedtime and increase to 2 units if morning blood sugar consistentl >150 over one week   Quantity:  15 mL   Refills:  1            Where to get your medicines      These medications were sent to [id number] - [place], [place] - [address]  [address], [place] [address]     Phone:  [phone]     metFORMIN 1000 MG tablet                Primary Care Provider Office Phone # Fax #    Amari Garg [phone]       [address] [place] [facility] [place] [address]        Equal Access to Services     [facility]: Hadii aad zhao hadasho Sorebeca, waaxda luqadaha, qaybta kaalmada hiram mckeon . So [facility] [phone].    ATENCIÓN: Si habla español, tiene a escobar disposición servicios gratuitos de asistencia lingüística. Llame al [phone].    We comply with applicable federal civil rights laws and  [place] laws. We do not discriminate on the basis of race, color, national origin, age, disability, sex, sexual orientation, or gender identity.            Thank you!     Thank you for choosing [facility] [place]  for your care. Our goal is always to provide you with excellent care. Hearing back from our patients is one way we can continue to improve our services. Please take a few minutes to complete the written survey that you may receive in the mail after your visit with us. Thank you!             Your Updated Medication List - Protect others around you: Learn how to safely use, store and throw away your medicines at www.disposemymeds.org.          This list is accurate as of 10/16/18 11:06 AM.  Always use your most recent med list.                   Brand Name Dispense Instructions for use Diagnosis    BD ALEXANDRO U/F 32G X 4 MM   Generic drug:  insulin pen needle     100 each    USE ONCE DAILY    Type 2 diabetes mellitus without complication, without long-term current use of insulin (H)       blood glucose monitoring lancets     1 Box    Use to test blood sugar one times daily or as directed.  Ok to substitute alternative if insurance prefers.    Type 2 diabetes mellitus without complication, without long-term current use of insulin (H)       * blood glucose monitoring meter device kit     1 kit    Use to test blood sugar 1 times daily or as directed.  Ok to substitute alternative if insurance prefers.    Type 2 diabetes mellitus without complication, without long-term current use of insulin (H)       * blood glucose monitoring meter device kit     1 kit    Use to test blood sugars 1 times daily or as directed.    Type 2 diabetes mellitus without complication, without long-term current use of insulin (H)       * blood glucose monitoring test strip    ACCU-CHEK HILARIO PLUS    100 strip    Use to test blood sugar 1 times daily or as directed.  Ok to substitute alternative if insurance prefers.    Type 2  diabetes mellitus without complication, without long-term current use of insulin (H)       * blood glucose monitoring test strip    no brand specified    100 strip    Use to test blood sugars 1 times daily or as directed    Type 2 diabetes mellitus without complication, without long-term current use of insulin (H)       LANTUS SOLOSTAR 100 UNIT/ML injection   Generic drug:  insulin glargine     15 mL    Inject 15 units subcutaneously at bedtime and increase to 2 units if morning blood sugar consistentl >150 over one week    Type 2 diabetes mellitus without complication, without long-term current use of insulin (H)       losartan 25 MG tablet    COZAAR    30 tablet    Take 1 tablet by mouth daily    Type 2 diabetes mellitus without complication, without long-term current use of insulin (H), Benign essential hypertension       metFORMIN 1000 MG tablet    GLUCOPHAGE    180 tablet    Take 1 tablet (1,000 mg) by mouth 2 times daily (with meals)    Type 2 diabetes mellitus without complication, without long-term current use of insulin (H)       metoprolol succinate 25 MG 24 hr tablet    TOPROL-XL    30 tablet    TAKE ONE TABLET BY MOUTH ONE TIME DAILY    Benign essential hypertension       pantoprazole 40 MG EC tablet    PROTONIX    30 tablet    TAKE ONE TABLET BY MOUTH ONE TIME DAILY    Type 2 diabetes mellitus without complication, without long-term current use of insulin (H)       VITAMIN D (CHOLECALCIFEROL) PO      Take by mouth daily        * Notice:  This list has 4 medication(s) that are the same as other medications prescribed for you. Read the directions carefully, and ask your doctor or other care provider to review them with you.

## 2018-10-16 NOTE — PROGRESS NOTES
SUBJECTIVE:   CC: Flakito Ureña is an 33 year old male who presents for preventative health visit.     Healthy Habits:    Do you get at least three servings of calcium containing foods daily (dairy, green leafy vegetables, etc.)? yes    Amount of exercise or daily activities, outside of work: 4 day(s) per week    Problems taking medications regularly No    Medication side effects: No    Have you had an eye exam in the past two years? no    Do you see a dentist twice per year? no    Do you have sleep apnea, excessive snoring or daytime drowsiness?yes       Patient has stopped taking Lantus for the last 6 weeks.  He thinks his morning blood sugars are better controlled.  He denies any side effects.  His episode drinking behavior is improved.  He is going out and doing some exercise otherwise he is feeling healthy.  Currently on blood pressure medication Toprol and losartan.    Complains of testicular discomfort.  No urinary symptoms no STD concerns.    Today's PHQ-2 Score:   PHQ-2 ( 1999 Pf[employer]) 10/16/2018 8/16/2017   Q1: Little interest or pleasure in doing things 0 0   Q2: Feeling down, depressed or hopeless 0 0   PHQ-2 Score 0 0       Abuse: Current or Past(Physical, Sexual or Emotional)- No  Do you feel safe in your environment - Yes    Social History   Substance Use Topics     Smoking status: Former Smoker     Smokeless tobacco: Never Used     Alcohol use 0.0 oz/week     0 Standard drinks or equivalent per week      If you drink alcohol do you typically have >3 drinks per day or >7 drinks per week? No                      Last PSA: No results found for: PSA    Reviewed orders with patient. Reviewed health maintenance and updated orders accordingly - Yes      Reviewed and updated as needed this visit by clinical staff  Tobacco  Allergies  Meds  Fam Hx  Soc Hx        Reviewed and updated as needed this visit by Provider            ROS:  CONSTITUTIONAL: NEGATIVE for fever, chills, change in  "weight  INTEGUMENTARY/SKIN: NEGATIVE for worrisome rashes, moles or lesions  EYES: NEGATIVE for vision changes or irritation  ENT: NEGATIVE for ear, mouth and throat problems  RESP: NEGATIVE for significant cough or SOB  CV: NEGATIVE for chest pain, palpitations or peripheral edema  GI: NEGATIVE for nausea, abdominal pain, heartburn, or change in bowel habits   male: negative for dysuria, hematuria, decreased urinary stream, erectile dysfunction, urethral discharge  MUSCULOSKELETAL: NEGATIVE for significant arthralgias or myalgia  NEURO: NEGATIVE for weakness, dizziness or paresthesias  PSYCHIATRIC: NEGATIVE for changes in mood or affect    OBJECTIVE:   /80  Pulse 88  Temp 98.1  F (36.7  C) (Tympanic)  Ht 5' 7.13\" (1.705 m)  Wt 171 lb (77.6 kg)  BMI 26.68 kg/m2  EXAM:  GENERAL: healthy, alert and no distress  EYES: Eyes grossly normal to inspection, PERRL and conjunctivae and sclerae normal  HENT: ear canals and TM's normal, nose and mouth without ulcers or lesions  NECK: no adenopathy, no asymmetry, masses, or scars and thyroid normal to palpation  RESP: lungs clear to auscultation - no rales, rhonchi or wheezes  CV: regular rate and rhythm, normal S1 S2, no S3 or S4, no murmur, click or rub, no peripheral edema and peripheral pulses strong  ABDOMEN: soft, nontender, no hepatosplenomegaly, no masses and bowel sounds normal  MS: no gross musculoskeletal defects noted, no edema  SKIN: no suspicious lesions or rashes  NEURO: Normal strength and tone, mentation intact and speech normal  PSYCH: mentation appears normal, affect normal/bright  Diabetic foot exam: normal DP and PT pulses, no trophic changes or ulcerative lesions and normal sensory exam  Testes are normal.  I do not appreciate any abnormality.        ASSESSMENT/PLAN:   1. Encounter for annual physical exam  Order ultrasound of testes to evaluate testicular tenderness.    Labs ordered once done we will follow-up on that  - HIV Screening  - FOOT " "EXAM  NO CHARGE [75336.114]  - HEMOGLOBIN A1C  - FLU VACCINE, SPLIT VIRUS, IM (QUADRIVALENT) [79447]- >3 YRS  - Vaccine Administration, Initial [94832]  - Comprehensive metabolic panel    2. Screening for HIV (human immunodeficiency virus)    - HIV Screening    3. Screening for diabetic peripheral neuropathy    - FOOT EXAM  NO CHARGE [59006.114]  - HEMOGLOBIN A1C    4. Need for prophylactic vaccination and inoculation against influenza    - FLU VACCINE, SPLIT VIRUS, IM (QUADRIVALENT) [77853]- >3 YRS  - Vaccine Administration, Initial [34731]    5. Alcohol abuse, episodic drinking behavior    - Comprehensive metabolic panel    6. Benign essential hypertension      7. Type 2 diabetes mellitus without complication, with long-term current use of insulin (H)  Patient hemoglobin A1c is around 7.3 which is stable when compared to last time.  I suggested he should not stop Lantus however I would suggest to continue taking Lantus 14 to 15 units at night.  He can continue metformin.  - FOOT EXAM  NO CHARGE [43605.114]  - HEMOGLOBIN A1C  - Comprehensive metabolic panel    COUNSELING:  Reviewed preventive health counseling, as reflected in patient instructions       Regular exercise       Healthy diet/nutrition    BP Readings from Last 1 Encounters:   10/16/18 124/80     Estimated body mass index is 26.68 kg/(m^2) as calculated from the following:    Height as of this encounter: 5' 7.13\" (1.705 m).    Weight as of this encounter: 171 lb (77.6 kg).           reports that he has quit smoking. He has never used smokeless tobacco.      Counseling Resources:  ATP IV Guidelines  Pooled Cohorts Equation Calculator  FRAX Risk Assessment  ICSI Preventive Guidelines  Dietary Guidelines for Americans, 2010  USDA's MyPlate  ASA Prophylaxis  Lung CA Screening    Amari Garg MD  [facility] [place]    Injectable Influenza Immunization Documentation    1.  Is the person to be vaccinated sick today?   No    2. Does the person to be " vaccinated have an allergy to a component   of the vaccine?   No  Egg Allergy Algorithm Link    3. Has the person to be vaccinated ever had a serious reaction   to influenza vaccine in the past?   No    4. Has the person to be vaccinated ever had Guillain-Barré syndrome?   No    Form completed by Panda CORNEJO CMA

## 2018-10-22 ENCOUNTER — TELEPHONE (OUTPATIENT)
Dept: FAMILY MEDICINE | Facility: CLINIC | Age: 33
End: 2018-10-22

## 2018-10-22 NOTE — TELEPHONE ENCOUNTER
Patient is Failing the Vascular/DM metrics due to potentially not being prescribed Aspirin/Statin. Please complete/sign an order to satisfy IVD/DM  Quality measure. Amanda Finch [facility]     Metrics  Percentage of patients (Ages 18-75 years) with a diagnosis of IVD, who meet all of the criteria listed below, in the measurement period.       The most recent Blood Pressure in the measurement period has a systolic value of less than 140 and a diastolic value of less than 90 (both values must be less than).     The patient is on a statin medication unless contraindication or valid exception is documented.   o LDL values again part of evaluation, with the addition of Statin Medication     any patient age 18 to 20 years = LDL PASS     any patient taking a Statin in measurement period = LDL PASS     any patient with a documented medical exception = LDL PASS     Special case: if there is no LDL data recorded and patient does not meet any of the criteria above, LDL FAIL   o For all other patients evaluate:   o age 21 - 75 years and LDL < 40 = LDL PASS   o age 21 - 75 years and LDL > 39 = LDL FAIL (unless they meet #2 or #3 above)       Patient is currently a non?tobacco user.   Patient is on daily aspirin OR an accepted contraindication (any date).

## 2018-10-25 ENCOUNTER — TELEPHONE (OUTPATIENT)
Dept: FAMILY MEDICINE | Facility: CLINIC | Age: 33
End: 2018-10-25

## 2018-10-25 DIAGNOSIS — E11.9 TYPE 2 DIABETES MELLITUS WITHOUT COMPLICATION, WITH LONG-TERM CURRENT USE OF INSULIN (H): Primary | ICD-10-CM

## 2018-10-25 DIAGNOSIS — Z79.4 TYPE 2 DIABETES MELLITUS WITHOUT COMPLICATION, WITH LONG-TERM CURRENT USE OF INSULIN (H): Primary | ICD-10-CM

## 2018-10-25 NOTE — TELEPHONE ENCOUNTER
Prior Authorization Retail Medication Request    Medication/Dose: LANTUS SOLOSTAR 100 UNIT/ML soln  ICD code (if different than what is on RX):  Type 2 diabetes mellitus without complication, without long-term current use of insulin (H) [E11.9]   Previously Tried and Failed:    Rationale:      Insurance Name:    Insurance ID:        Pharmacy Information (if different than what is on RX)  Name:  [facility]  Phone:  [phone]

## 2018-10-25 NOTE — TELEPHONE ENCOUNTER
PRIOR AUTHORIZATION DENIED    Medication: Lantus Solostar    Denial Date: 10/25/2018    Denial Rational:      Appeal Information:    If you would like to appeal, please supply P/A team with a letter of medical necessity with clinical reason.

## 2018-10-26 RX ORDER — INSULIN GLARGINE 100 [IU]/ML
10 INJECTION, SOLUTION SUBCUTANEOUS DAILY
Qty: 15 ML | Refills: 1 | Status: SHIPPED | OUTPATIENT
Start: 2018-10-26 | End: 2019-01-30

## 2019-01-29 DIAGNOSIS — E11.9 TYPE 2 DIABETES MELLITUS WITHOUT COMPLICATION, WITH LONG-TERM CURRENT USE OF INSULIN (H): ICD-10-CM

## 2019-01-29 DIAGNOSIS — Z79.4 TYPE 2 DIABETES MELLITUS WITHOUT COMPLICATION, WITH LONG-TERM CURRENT USE OF INSULIN (H): ICD-10-CM

## 2019-01-30 RX ORDER — INSULIN GLARGINE 100 [IU]/ML
10 INJECTION, SOLUTION SUBCUTANEOUS DAILY
Qty: 15 ML | Refills: 1 | Status: SHIPPED | OUTPATIENT
Start: 2019-01-30 | End: 2019-07-16

## 2019-01-30 NOTE — TELEPHONE ENCOUNTER
"This looks like a refill request. Per last labs ok 3-6 months  One refill sent to pharmacy  Jackie Enriquez,RN BSN  [facility]  [phone]    Future Office Visit:    Requested Prescriptions   Pending Prescriptions Disp Refills     insulin glargine (BASAGLAR KWIKPEN) 100 UNIT/ML pen 15 mL 1     Sig: Inject 10 Units Subcutaneous daily    Long Acting Insulin Protocol Passed - 1/29/2019  3:02 PM       Passed - Blood pressure less than 140/90 in past 6 months    BP Readings from Last 3 Encounters:   10/16/18 124/80   03/07/18 138/86   08/16/17 130/80                Passed - LDL on file in past 12 months    Recent Labs   Lab Test 03/07/18  1832   LDL 79            Passed - Microalbumin on file in past 12 months    Recent Labs   Lab Test 03/07/18  1837   MICROL 5   UMALCR 37.38*            Passed - Serum creatinine on file in past 12 months    Recent Labs   Lab Test 10/16/18  1005   CR 0.75            Passed - HgbA1C in past 3 or 6 months    If HgbA1C is 8 or greater, it needs to be on file within the past 3 months.  If less than 8, must be on file within the past 6 months.     Recent Labs   Lab Test 10/16/18  1005   A1C 7.3*            Passed - Medication is active on med list       Passed - Patient is age 18 or older       Passed - Recent (6 mo) or future (30 days) visit within the authorizing provider's specialty    Patient had office visit in the last 6 months or has a visit in the next 30 days with authorizing provider or within the authorizing provider's specialty.  See \"Patient Info\" tab in inbasket, or \"Choose Columns\" in Meds & Orders section of the refill encounter.              "

## 2019-04-14 DIAGNOSIS — E11.9 TYPE 2 DIABETES MELLITUS WITHOUT COMPLICATION, WITHOUT LONG-TERM CURRENT USE OF INSULIN (H): ICD-10-CM

## 2019-04-15 NOTE — TELEPHONE ENCOUNTER
"Requested Prescriptions   Pending Prescriptions Disp Refills     metFORMIN (GLUCOPHAGE) 1000 MG tablet [Pharmacy Med Name: metFORMIN HCl Oral Tablet 1000 MG] 180 tablet 0     Sig: Take 1 tablet (1,000 mg) by mouth 2 times daily (with meals)       Biguanide Agents Failed - 4/14/2019  7:01 AM        Failed - Patient has documented LDL within the past 12 mos.     Recent Labs   Lab Test 03/07/18  1832   LDL 79             Passed - Blood pressure less than 140/90 in past 6 months     BP Readings from Last 3 Encounters:   10/16/18 124/80   03/07/18 138/86   08/16/17 130/80                 Passed - Patient has had a Microalbumin in the past 15 mos.     Recent Labs   Lab Test 03/07/18  1837   MICROL 5   UMALCR 37.38*             Passed - Patient is age 10 or older        Passed - Patient has documented A1c within the specified period of time.     If HgbA1C is 8 or greater, it needs to be on file within the past 3 months.  If less than 8, must be on file within the past 6 months.     Recent Labs   Lab Test 10/16/18  1005   A1C 7.3*             Passed - Patient's CR is NOT>1.4 OR Patient's EGFR is NOT<45 within past 12 mos.     Recent Labs   Lab Test 10/16/18  1005   GFRESTIMATED >90   GFRESTBLACK >90       Recent Labs   Lab Test 10/16/18  1005   CR 0.75             Passed - Patient does NOT have a diagnosis of CHF.        Passed - Medication is active on med list        Passed - Recent (6 mo) or future (30 days) visit within the authorizing provider's specialty     Patient had office visit in the last 6 months or has a visit in the next 30 days with authorizing provider or within the authorizing provider's specialty.  See \"Patient Info\" tab in inbasket, or \"Choose Columns\" in Meds & Orders section of the refill encounter.            Last Written Prescription Date:  10/16/2018  Last Fill Quantity: 180,  # refills: 1   Last office visit: 10/16/2018 with prescribing provider:  10/16/2018   Future Office Visit:   Next 5 " appointments (look out 90 days)    Apr 18, 2019  8:20 AM CDT  PHYSICAL with Amari Garg MD  [facility] ([facility] [address] [place] [id number] [phone]

## 2019-04-15 NOTE — TELEPHONE ENCOUNTER
Prescription approved per [facility] Refill Protocol.    Debra RUTLEDGE RN  EP Triage

## 2019-05-26 DIAGNOSIS — I10 BENIGN ESSENTIAL HYPERTENSION: ICD-10-CM

## 2019-05-26 DIAGNOSIS — E11.9 TYPE 2 DIABETES MELLITUS WITHOUT COMPLICATION, WITHOUT LONG-TERM CURRENT USE OF INSULIN (H): ICD-10-CM

## 2019-05-26 NOTE — LETTER
May 30, 2019      Flakito Ureña  [address] [address]        Dear Flakito,    I care about your health and have reviewed your health plan. I have reviewed your medical conditions, medication list, and lab results and am making recommendations based on this review, to better manage your health.    You are in particular need of attention regarding:  -your medication    I am recommending that you:  -Schedule an appointment    Here is a list of Health Maintenance topics that are due now or due soon:  Health Maintenance Due   Topic Date Due     Eye Exam  1985     Diptheria Tetanus Pertussis (DTAP/TDAP/TD) Vaccine (1 - Tdap) 06/21/2009     PHQ-2  01/01/2019     Cholesterol Lab  03/07/2019     Kidney Function Urine Test  03/07/2019     A1C Lab  04/16/2019       Please call us at [phone] (or use [email or web address]) to address the above recommendations.     Thank you for trusting [facility] and we appreciate the opportunity to serve you.  We look forward to supporting your healthcare needs in the future.    Healthy Regards,    Amari Garg MD

## 2019-05-28 RX ORDER — METOPROLOL SUCCINATE 25 MG/1
TABLET, EXTENDED RELEASE ORAL
Qty: 30 TABLET | Refills: 0 | Status: SHIPPED | OUTPATIENT
Start: 2019-05-28 | End: 2019-07-16

## 2019-05-28 RX ORDER — LOSARTAN POTASSIUM 25 MG/1
TABLET ORAL
Qty: 30 TABLET | Refills: 0 | Status: SHIPPED | OUTPATIENT
Start: 2019-05-28 | End: 2019-07-16

## 2019-05-28 NOTE — TELEPHONE ENCOUNTER
"Requested Prescriptions   Pending Prescriptions Disp Refills     metoprolol succinate ER (TOPROL-XL) 25 MG 24 hr tablet [Pharmacy Med Name: Metoprolol Succinate ER Oral Tablet Extended Release 24 Hour 25 MG] 30 tablet 4     Sig: TAKE ONE TABLET BY MOUTH ONE TIME DAILY   Last Written Prescription Date:  10/1/18  Last Fill Quantity: 30,  # refills: 6   Last office visit: 10/16/2018 with prescribing provider:  [facility]   Future Office Visit:        Beta-Blockers Protocol Passed - 5/26/2019  7:01 AM        Passed - Blood pressure under 140/90 in past 12 months     BP Readings from Last 3 Encounters:   10/16/18 124/80   03/07/18 138/86   08/16/17 130/80                 Passed - Patient is age 6 or older        Passed - Recent (12 mo) or future (30 days) visit within the authorizing provider's specialty     Patient had office visit in the last 12 months or has a visit in the next 30 days with authorizing provider or within the authorizing provider's specialty.  See \"Patient Info\" tab in inbasket, or \"Choose Columns\" in Meds & Orders section of the refill encounter.              Passed - Medication is active on med list        losartan (COZAAR) 25 MG tablet [Pharmacy Med Name: Losartan Potassium Oral Tablet 25 MG] 30 tablet 4     Sig: TAKE 1 TABLET BY MOUTH DAILY   Last Written Prescription Date:  10/1/18  Last Fill Quantity: 30,  # refills: 6   Last office visit: 10/16/2018 with prescribing provider:  Forest Russo Office Visit:        Angiotensin-II Receptors Passed - 5/26/2019  7:01 AM        Passed - Blood pressure under 140/90 in past 12 months     BP Readings from Last 3 Encounters:   10/16/18 124/80   03/07/18 138/86   08/16/17 130/80                 Passed - Recent (12 mo) or future (30 days) visit within the authorizing provider's specialty     Patient had office visit in the last 12 months or has a visit in the next 30 days with authorizing provider or within the authorizing provider's specialty.  See \"Patient " "Info\" tab in inbasket, or \"Choose Columns\" in Meds & Orders section of the refill encounter.              Passed - Medication is active on med list        Passed - Patient is age 18 or older        Passed - Normal serum creatinine on file in past 12 months     Recent Labs   Lab Test 10/16/18  1005   CR 0.75             Passed - Normal serum potassium on file in past 12 months     Recent Labs   Lab Test 10/16/18  1005   POTASSIUM 4.5                      "

## 2019-05-28 NOTE — TELEPHONE ENCOUNTER
Patient due for fasting office visit- 30 days supply given.  Routing to team to schedule appointment     Jackie Enriquez RN  [facility]  [phone]

## 2019-05-28 NOTE — TELEPHONE ENCOUNTER
Micki Lee contacted [facility] on 05/28/19 and left a message. If patient calls back please schedule appointment in 1 month for a fasting medication check.      .Micki LIND    [facility] [place]

## 2019-05-29 NOTE — TELEPHONE ENCOUNTER
Micki Lee contacted [facility] on 05/29/19 and left a message. If patient calls back please schedule appointment in 1 month for a fasting med check.        .Micki LIND    [facility] [place]

## 2019-05-30 NOTE — TELEPHONE ENCOUNTER
Micki Lee contacted [facility] on 05/30/19 and left a message. If patient calls back please schedule appointment in 1 month for a fasting med check.      TC will mail letter.      .Micki LIND    [facility]

## 2019-06-01 ENCOUNTER — TRANSFERRED RECORDS (OUTPATIENT)
Dept: HEALTH INFORMATION MANAGEMENT | Facility: CLINIC | Age: 34
End: 2019-06-01

## 2019-07-16 ENCOUNTER — OFFICE VISIT (OUTPATIENT)
Dept: FAMILY MEDICINE | Facility: CLINIC | Age: 34
End: 2019-07-16
Payer: COMMERCIAL

## 2019-07-16 VITALS
HEIGHT: 67 IN | TEMPERATURE: 98.1 F | WEIGHT: 164 LBS | SYSTOLIC BLOOD PRESSURE: 114 MMHG | BODY MASS INDEX: 25.74 KG/M2 | DIASTOLIC BLOOD PRESSURE: 70 MMHG | HEART RATE: 92 BPM

## 2019-07-16 DIAGNOSIS — Z13.220 SCREENING FOR HYPERLIPIDEMIA: Primary | ICD-10-CM

## 2019-07-16 DIAGNOSIS — F10.10 ALCOHOL ABUSE, EPISODIC DRINKING BEHAVIOR: ICD-10-CM

## 2019-07-16 DIAGNOSIS — I10 BENIGN ESSENTIAL HYPERTENSION: ICD-10-CM

## 2019-07-16 DIAGNOSIS — E11.9 TYPE 2 DIABETES MELLITUS WITHOUT COMPLICATION, WITHOUT LONG-TERM CURRENT USE OF INSULIN (H): ICD-10-CM

## 2019-07-16 DIAGNOSIS — Z11.3 SCREEN FOR STD (SEXUALLY TRANSMITTED DISEASE): ICD-10-CM

## 2019-07-16 LAB — HBA1C MFR BLD: 7.1 % (ref 0–5.6)

## 2019-07-16 PROCEDURE — 36415 COLL VENOUS BLD VENIPUNCTURE: CPT | Performed by: FAMILY MEDICINE

## 2019-07-16 PROCEDURE — 99214 OFFICE O/P EST MOD 30 MIN: CPT | Performed by: FAMILY MEDICINE

## 2019-07-16 PROCEDURE — 87491 CHLMYD TRACH DNA AMP PROBE: CPT | Performed by: FAMILY MEDICINE

## 2019-07-16 PROCEDURE — 82043 UR ALBUMIN QUANTITATIVE: CPT | Performed by: FAMILY MEDICINE

## 2019-07-16 PROCEDURE — 87340 HEPATITIS B SURFACE AG IA: CPT | Performed by: FAMILY MEDICINE

## 2019-07-16 PROCEDURE — 80061 LIPID PANEL: CPT | Performed by: FAMILY MEDICINE

## 2019-07-16 PROCEDURE — 86803 HEPATITIS C AB TEST: CPT | Performed by: FAMILY MEDICINE

## 2019-07-16 PROCEDURE — 80053 COMPREHEN METABOLIC PANEL: CPT | Performed by: FAMILY MEDICINE

## 2019-07-16 PROCEDURE — 83036 HEMOGLOBIN GLYCOSYLATED A1C: CPT | Performed by: FAMILY MEDICINE

## 2019-07-16 PROCEDURE — 87591 N.GONORRHOEAE DNA AMP PROB: CPT | Performed by: FAMILY MEDICINE

## 2019-07-16 PROCEDURE — 87389 HIV-1 AG W/HIV-1&-2 AB AG IA: CPT | Performed by: FAMILY MEDICINE

## 2019-07-16 RX ORDER — LOSARTAN POTASSIUM 25 MG/1
25 TABLET ORAL DAILY
Qty: 90 TABLET | Refills: 3 | Status: SHIPPED | OUTPATIENT
Start: 2019-07-16

## 2019-07-16 RX ORDER — METOPROLOL SUCCINATE 25 MG/1
25 TABLET, EXTENDED RELEASE ORAL DAILY
Qty: 90 TABLET | Refills: 3 | Status: SHIPPED | OUTPATIENT
Start: 2019-07-16

## 2019-07-16 RX ORDER — INSULIN GLARGINE 100 [IU]/ML
10 INJECTION, SOLUTION SUBCUTANEOUS DAILY
Qty: 15 ML | Refills: 3 | Status: SHIPPED | OUTPATIENT
Start: 2019-07-16

## 2019-07-16 ASSESSMENT — MIFFLIN-ST. JEOR: SCORE: 1637.53

## 2019-07-16 NOTE — PROGRESS NOTES
Subjective     Flakito Ureña is a 34 year old male who presents to clinic today for the following health issues:    HPI   Diabetes Follow-up/high cholesterol and high blood pressure follow up    Overall feels healthy however he still have some episodic drinking issue.  Started smoking few weeks ago but he is planning to quit.  Currently on Metformin and long-acting insulin.  His blood sugar according to him has been stable.  Blood pressure is stable.  Denies any side effects from cholesterol medication.      How often are you checking your blood sugar? A few times a week    What time of day are you checking your blood sugars (select all that apply)?  Before meals    Have you had any blood sugars above 200?  Yes sometimes depends on the food he has eaten the night before or alcohol intake    Have you had any blood sugars below 70?  No    What symptoms do you notice when your blood sugar is low?  Shaky sometimes after working out    What concerns do you have today about your diabetes? None     Do you have any of these symptoms? (Select all that apply)  No numbness or tingling in feet.  No redness, sores or blisters on feet.  No complaints of excessive thirst.  No reports of blurry vision.  No significant changes to weight.     Have you had a diabetic eye exam in the last 12 months? Yes- Date of last eye exam: 6/2019    BP Readings from Last 2 Encounters:   07/16/19 114/70   10/16/18 124/80     Hemoglobin A1C (%)   Date Value   10/16/2018 7.3 (H)   03/07/2018 7.2 (H)     LDL Cholesterol Calculated (mg/dL)   Date Value   03/07/2018 79   09/20/2017 99       Diabetes Management Resources    Amount of exercise or physical activity: 3 days per week    Problems taking medications regularly: No    Medication side effects: none    Diet: regular (no restrictions)      Patient would also like to get an STD screen.  He denies any current symptoms        Reviewed and updated as needed this visit by Provider         Review of  "Systems   ROS COMP: Constitutional, HEENT, cardiovascular, pulmonary, gi and gu systems are negative, except as otherwise noted.      Objective    /70   Pulse 92   Temp 98.1  F (36.7  C) (Tympanic)   Ht 1.702 m (5' 7\")   Wt 74.4 kg (164 lb)   BMI 25.69 kg/m    Body mass index is 25.69 kg/m .  Physical Exam   GENERAL: healthy, alert and no distress  NECK: no adenopathy, no asymmetry, masses, or scars and thyroid normal to palpation  RESP: lungs clear to auscultation - no rales, rhonchi or wheezes  CV: regular rate and rhythm, normal S1 S2, no S3 or S4, no murmur, click or rub, no peripheral edema and peripheral pulses strong  ABDOMEN: soft, nontender, no hepatosplenomegaly, no masses and bowel sounds normal  MS: no gross musculoskeletal defects noted, no edema            Assessment & Plan     1. Screening for hyperlipidemia    - Lipid panel reflex to direct LDL Fasting  - Comprehensive metabolic panel    2. Type 2 diabetes mellitus without complication, without long-term current use of insulin (H)  Medication refilled his hemoglobin A1c is stable.  - Albumin Random Urine Quantitative with Creat Ratio  - HEMOGLOBIN A1C  - Comprehensive metabolic panel    3. Alcohol abuse, episodic drinking behavior    4. Screen for STD (sexually transmitted disease)    - NEISSERIA GONORRHOEA PCR  - CHLAMYDIA TRACHOMATIS PCR  - HIV Antigen Antibody Combo  - Hepatitis C antibody  - Hepatitis B surface antigen     BMI:   Estimated body mass index is 25.69 kg/m  as calculated from the following:    Height as of this encounter: 1.702 m (5' 7\").    Weight as of this encounter: 74.4 kg (164 lb).     Amari Garg MD  [facility]      "

## 2019-07-16 NOTE — Clinical Note
Please abstract the following data from this visit with this patient into the appropriate field in Epic:Eye exam with ophthalmology on this date: 6/2019 done in [place]

## 2019-07-17 LAB
ALBUMIN SERPL-MCNC: 4.3 G/DL (ref 3.4–5)
ALP SERPL-CCNC: 69 U/L (ref 40–150)
ALT SERPL W P-5'-P-CCNC: 109 U/L (ref 0–70)
ANION GAP SERPL CALCULATED.3IONS-SCNC: 7 MMOL/L (ref 3–14)
AST SERPL W P-5'-P-CCNC: 86 U/L (ref 0–45)
BILIRUB SERPL-MCNC: 0.6 MG/DL (ref 0.2–1.3)
BUN SERPL-MCNC: 7 MG/DL (ref 7–30)
C TRACH DNA SPEC QL NAA+PROBE: NEGATIVE
CALCIUM SERPL-MCNC: 9.6 MG/DL (ref 8.5–10.1)
CHLORIDE SERPL-SCNC: 96 MMOL/L (ref 94–109)
CHOLEST SERPL-MCNC: 204 MG/DL
CO2 SERPL-SCNC: 30 MMOL/L (ref 20–32)
CREAT SERPL-MCNC: 0.71 MG/DL (ref 0.66–1.25)
CREAT UR-MCNC: 14 MG/DL
GFR SERPL CREATININE-BSD FRML MDRD: >90 ML/MIN/{1.73_M2}
GLUCOSE SERPL-MCNC: 195 MG/DL (ref 70–99)
HBV SURFACE AG SERPL QL IA: NONREACTIVE
HCV AB SERPL QL IA: NONREACTIVE
HDLC SERPL-MCNC: 41 MG/DL
HIV 1+2 AB+HIV1 P24 AG SERPL QL IA: NONREACTIVE
LDLC SERPL CALC-MCNC: 89 MG/DL
MICROALBUMIN UR-MCNC: <5 MG/L
MICROALBUMIN/CREAT UR: NORMAL MG/G CR (ref 0–17)
N GONORRHOEA DNA SPEC QL NAA+PROBE: NEGATIVE
NONHDLC SERPL-MCNC: 163 MG/DL
POTASSIUM SERPL-SCNC: 5.2 MMOL/L (ref 3.4–5.3)
PROT SERPL-MCNC: 8.1 G/DL (ref 6.8–8.8)
SODIUM SERPL-SCNC: 133 MMOL/L (ref 133–144)
SPECIMEN SOURCE: NORMAL
SPECIMEN SOURCE: NORMAL
TRIGL SERPL-MCNC: 372 MG/DL

## 2019-07-18 DIAGNOSIS — E11.9 TYPE 2 DIABETES MELLITUS WITHOUT COMPLICATION, WITHOUT LONG-TERM CURRENT USE OF INSULIN (H): ICD-10-CM

## 2019-07-19 NOTE — TELEPHONE ENCOUNTER
"Requested Prescriptions   Pending Prescriptions Disp Refills     pantoprazole (PROTONIX) 40 MG EC tablet [Pharmacy Med Name: Pantoprazole Sodium Oral Tablet Delayed Release 40 MG] 30 tablet 9     Sig: TAKE ONE TABLET BY MOUTH ONE TIME DAILY  Last Written Prescription Date:  4/23/18  Last Fill Quantity: 30,  # refills: 10   Last office visit: 7/16/2019 with prescribing provider:  [facility]   Future Office Visit:           PPI Protocol Passed - 7/18/2019  7:19 PM        Passed - Not on Clopidogrel (unless Pantoprazole ordered)        Passed - No diagnosis of osteoporosis on record        Passed - Recent (12 mo) or future (30 days) visit within the authorizing provider's specialty     Patient had office visit in the last 12 months or has a visit in the next 30 days with authorizing provider or within the authorizing provider's specialty.  See \"Patient Info\" tab in inbasket, or \"Choose Columns\" in Meds & Orders section of the refill encounter.              Passed - Medication is active on med list        Passed - Patient is age 18 or older          "

## 2019-07-22 RX ORDER — PANTOPRAZOLE SODIUM 40 MG/1
TABLET, DELAYED RELEASE ORAL
Qty: 30 TABLET | Refills: 9 | Status: SHIPPED | OUTPATIENT
Start: 2019-07-22 | End: 2020-09-22

## 2019-09-27 DIAGNOSIS — Z79.4 TYPE 2 DIABETES MELLITUS WITHOUT COMPLICATION, WITH LONG-TERM CURRENT USE OF INSULIN (H): ICD-10-CM

## 2019-09-27 DIAGNOSIS — E11.9 TYPE 2 DIABETES MELLITUS WITHOUT COMPLICATION, WITH LONG-TERM CURRENT USE OF INSULIN (H): ICD-10-CM

## 2019-09-27 RX ORDER — INSULIN GLARGINE 100 [IU]/ML
10 INJECTION, SOLUTION SUBCUTANEOUS DAILY
Qty: 9 ML | Refills: 0 | OUTPATIENT
Start: 2019-09-27

## 2019-09-27 NOTE — TELEPHONE ENCOUNTER
"Requested Prescriptions   Pending Prescriptions Disp Refills     insulin glargine (BASAGLAR KWIKPEN) 100 UNIT/ML pen [Pharmacy Med Name: Basaglar KwikPen Subcutaneous Solution Pen-injector 100 UNIT/ML]  Last Written Prescription Date:  7/16/2019  Last Fill Quantity: 15ml,  # refills: 3   Last Office Visit: 7/16/2019 [facility]  Future Office Visit:      9 mL 0     Sig: Inject 10 Units Subcutaneous daily       Long Acting Insulin Protocol Passed - 9/27/2019  9:50 AM        Passed - Blood pressure less than 140/90 in past 6 months     BP Readings from Last 3 Encounters:   07/16/19 114/70   10/16/18 124/80   03/07/18 138/86                 Passed - LDL on file in past 12 months     Recent Labs   Lab Test 07/16/19  1455   LDL 89             Passed - Microalbumin on file in past 12 months     Recent Labs   Lab Test 07/16/19  1456   MICROL <5   UMALCR Unable to calculate due to low value             Passed - Serum creatinine on file in past 12 months     Recent Labs   Lab Test 07/16/19  1455   CR 0.71             Passed - HgbA1C in past 3 or 6 months     If HgbA1C is 8 or greater, it needs to be on file within the past 3 months.  If less than 8, must be on file within the past 6 months.     Recent Labs   Lab Test 07/16/19  1455   A1C 7.1*             Passed - Medication is active on med list        Passed - Patient is age 18 or older        Passed - Recent (6 mo) or future (30 days) visit within the authorizing provider's specialty     Patient had office visit in the last 6 months or has a visit in the next 30 days with authorizing provider or within the authorizing provider's specialty.  See \"Patient Info\" tab in inbasket, or \"Choose Columns\" in Meds & Orders section of the refill encounter.            "

## 2019-09-27 NOTE — TELEPHONE ENCOUNTER
Spoke with Daniel [facility]. Patient does have current prescription on file. No need for refill at this time. Yris Sanchez RN

## 2019-10-31 ENCOUNTER — MYC REFILL (OUTPATIENT)
Dept: FAMILY MEDICINE | Facility: CLINIC | Age: 34
End: 2019-10-31

## 2019-10-31 DIAGNOSIS — E11.9 TYPE 2 DIABETES MELLITUS WITHOUT COMPLICATION, WITHOUT LONG-TERM CURRENT USE OF INSULIN (H): ICD-10-CM

## 2019-10-31 NOTE — TELEPHONE ENCOUNTER
Prescription approved per [facility] Refill Protocol.    Key Pro RN, BSN  [facility]

## 2019-10-31 NOTE — TELEPHONE ENCOUNTER
"Requested Prescriptions   Pending Prescriptions Disp Refills     insulin pen needle (BD ALEXANDRO U/F) 32G X 4 MM miscellaneous 100 each PRN     Sig: Use 1 pen needles daily or as directed.   Last Written Prescription Date:  7/10/18  Last Fill Quantity: 100,  # refills: prn   Last office visit: 7/16/2019 with prescribing provider:  [facility]   Future Office Visit:        Diabetic Supplies Protocol Passed - 10/31/2019  2:08 PM        Passed - Medication is active on med list        Passed - Patient is 18 years of age or older        Passed - Recent (6 mo) or future (30 days) visit within the authorizing provider's specialty     Patient had office visit in the last 6 months or has a visit in the next 30 days with authorizing provider.  See \"Patient Info\" tab in inbasket, or \"Choose Columns\" in Meds & Orders section of the refill encounter.              "

## 2019-11-11 ENCOUNTER — TELEPHONE (OUTPATIENT)
Dept: FAMILY MEDICINE | Facility: CLINIC | Age: 34
End: 2019-11-11

## 2019-11-11 ENCOUNTER — NURSE TRIAGE (OUTPATIENT)
Dept: NURSING | Facility: CLINIC | Age: 34
End: 2019-11-11

## 2019-11-11 DIAGNOSIS — E11.9 TYPE 2 DIABETES MELLITUS WITHOUT COMPLICATION, WITHOUT LONG-TERM CURRENT USE OF INSULIN (H): ICD-10-CM

## 2019-11-11 NOTE — TELEPHONE ENCOUNTER
He wants to transfer a  order to his new pharmacy, which is pen needles for his insulin pen. I transferred him to his clinic in [place] and the triage nurse will help him with this.    Dania Calzada RN/ [facility]      Reason for Disposition    Caller requesting a NON-URGENT new prescription or refill and triager unable to refill per unit policy    Protocols used: MEDICATION QUESTION CALL-A-AH

## 2019-11-11 NOTE — TELEPHONE ENCOUNTER
Flakito is in [place] and forgot pen needles. I sent Rx to his current pharmacy    Maya Lackey RN- Triage [employer]

## 2019-12-13 DIAGNOSIS — I10 BENIGN ESSENTIAL HYPERTENSION: ICD-10-CM

## 2019-12-13 RX ORDER — METOPROLOL SUCCINATE 25 MG/1
TABLET, EXTENDED RELEASE ORAL
Qty: 90 TABLET | Refills: 1 | OUTPATIENT
Start: 2019-12-13

## 2019-12-13 NOTE — TELEPHONE ENCOUNTER
"Requested Prescriptions   Pending Prescriptions Disp Refills     metoprolol succinate ER (TOPROL-XL) 25 MG 24 hr tablet [Pharmacy Med Name: METOPROLOL SUCC ER 25 MG TAB] 90 tablet 1     Sig: TAKE ONE TABLET BY MOUTH DAILY       Beta-Blockers Protocol Passed - 12/13/2019  1:28 AM        Passed - Blood pressure under 140/90 in past 12 months     BP Readings from Last 3 Encounters:   07/16/19 114/70   10/16/18 124/80   03/07/18 138/86                 Passed - Patient is age 6 or older        Passed - Recent (12 mo) or future (30 days) visit within the authorizing provider's specialty     Patient has had an office visit with the authorizing provider or a provider within the authorizing providers department within the previous 12 mos or has a future within next 30 days. See \"Patient Info\" tab in inbasket, or \"Choose Columns\" in Meds & Orders section of the refill encounter.              Passed - Medication is active on med list        Last Written Prescription Date:  7/16/2019  Last Fill Quantity: 90,  # refills: 3   Last office visit: 7/16/2019 with prescribing provider:  7/16/2019   Future Office Visit:      "

## 2020-02-26 DIAGNOSIS — E11.9 TYPE 2 DIABETES MELLITUS WITHOUT COMPLICATION, WITHOUT LONG-TERM CURRENT USE OF INSULIN (H): ICD-10-CM

## 2020-02-26 NOTE — TELEPHONE ENCOUNTER
Medication is being filled for 1 time refill only due to:  Patient is due for a Diabetes follow up office visit.      Routing to team to inform and assist with scheduling. Thank you very much.     Key Pro RN, BSN  [facility] [place]

## 2020-02-26 NOTE — TELEPHONE ENCOUNTER
"Requested Prescriptions   Pending Prescriptions Disp Refills     insulin pen needle (32G X 4 MM) 32G X 4 MM miscellaneous [Pharmacy Med Name: BD UF ALEXANDRO PEN NEEDLE [id number]] 100 each 0     Sig: USE ONCE DAILY OR AS DIRECTED.       Diabetic Supplies Protocol Failed - 2/26/2020  9:07 AM        Failed - Recent (6 mo) or future (30 days) visit within the authorizing provider's specialty     Patient had office visit in the last 6 months or has a visit in the next 30 days with authorizing provider.  See \"Patient Info\" tab in inbasket, or \"Choose Columns\" in Meds & Orders section of the refill encounter.            Passed - Medication is active on med list        Passed - Patient is 18 years of age or older        Last Written Prescription Date:  11/11/2019  Last Fill Quantity: 100,  # refills: 0   Last office visit: 7/16/2019 with prescribing provider:  77/16/2019   Future Office Visit:      "

## 2020-03-04 NOTE — TELEPHONE ENCOUNTER
Micki Lee contacted [facility] on 03/04/20 and left a message. If patient calls back please schedule appointment as soon as possible for Diabetic check.    .Micki LIND    [facility] [place]

## 2020-03-10 ENCOUNTER — HEALTH MAINTENANCE LETTER (OUTPATIENT)
Age: 35
End: 2020-03-10

## 2020-03-12 NOTE — TELEPHONE ENCOUNTER
Patient no longer coming to our clinic, moved to [place] and has established with a doctor there already.  Elicia Quiñonez MA

## 2020-07-15 DIAGNOSIS — E11.9 TYPE 2 DIABETES MELLITUS WITHOUT COMPLICATION, WITHOUT LONG-TERM CURRENT USE OF INSULIN (H): ICD-10-CM

## 2020-07-15 NOTE — TELEPHONE ENCOUNTER
Patient is overdue for office visit/virtual visit. Last office visit was July of 2019. Routing to team to inform and assist with scheduling virtual visit with provider. Once patient is scheduled for visit, route back to triage to address refill. Thank you very much.    Key Pro RN, BSN  [facility] [place]

## 2020-07-15 NOTE — TELEPHONE ENCOUNTER
[employer] message sent.  Patient is overdue for office visit/virtual visit    .Micki LIND    [facility] [place]

## 2020-07-22 NOTE — TELEPHONE ENCOUNTER
Per note below, patient no longer under provider care. Denied Rx.       Key Pro RN, BSN  [facility]

## 2020-08-04 DIAGNOSIS — E11.9 TYPE 2 DIABETES MELLITUS WITHOUT COMPLICATION, WITHOUT LONG-TERM CURRENT USE OF INSULIN (H): ICD-10-CM

## 2020-08-04 NOTE — TELEPHONE ENCOUNTER
[employer] message sent.  Diabetic check.    .Micki LIND    [facility] [place]

## 2020-08-04 NOTE — TELEPHONE ENCOUNTER
Patient is overdue for office visit and Diabetes follow up. Diabetes follow up is every 6 months, patient is very overdue. Last office visit was July of 2019. Routing to team to inform and assist with scheduling virtual visit with provider. Once patient is scheduled for visit, route back to triage to address refill. Thank you very much.    Key Pro RN, BSN  [facility] [place]

## 2020-08-05 ENCOUNTER — TELEPHONE (OUTPATIENT)
Dept: FAMILY MEDICINE | Facility: CLINIC | Age: 35
End: 2020-08-05

## 2020-08-05 DIAGNOSIS — E11.9 TYPE 2 DIABETES MELLITUS WITHOUT COMPLICATION, WITHOUT LONG-TERM CURRENT USE OF INSULIN (H): ICD-10-CM

## 2020-08-05 NOTE — TELEPHONE ENCOUNTER
Please call pt to schedule a diabetes check.  Once appt is scheduled route back to triage to address refill on pen needles.    Debra RUTLEDGE RN  EP Triage

## 2020-08-14 DIAGNOSIS — E11.9 TYPE 2 DIABETES MELLITUS WITHOUT COMPLICATION, WITHOUT LONG-TERM CURRENT USE OF INSULIN (H): ICD-10-CM

## 2020-08-14 NOTE — TELEPHONE ENCOUNTER
Routing refill request to provider for review/approval because:  Labs not current:  Last A1C, Cr, GFR 7/16/19   A break in medication - last refill 7/16/19 #180 refill 1  Patient needs to be seen because it has been more than 1 year since last office visit.  Left non-detailed message to call the clinic back at [phone] and ask to speak with a  triage nurse.   Yris Sanchez RN

## 2020-09-06 DIAGNOSIS — E11.9 TYPE 2 DIABETES MELLITUS WITHOUT COMPLICATION, WITHOUT LONG-TERM CURRENT USE OF INSULIN (H): ICD-10-CM

## 2020-09-06 NOTE — LETTER
September 16, 2020      Flakito Ureña  [address]  [address] [place] [id number]          Dear Mr. Ureña,    Your physician requires an office visit in order to monitor your maintenance medication(s).  We are unable to refill of your medication(s) until you can be seen by your provider.  Please call the clinic at [phone] to schedule an appointment.        Sincerely,    [facility]

## 2020-09-08 NOTE — TELEPHONE ENCOUNTER
Patient due for fasting office visit-    Routing to team to schedule appointment     Jackie WASHINGTONRN  [facility]  [phone]

## 2020-09-22 RX ORDER — PANTOPRAZOLE SODIUM 40 MG/1
TABLET, DELAYED RELEASE ORAL
Qty: 90 TABLET | Refills: 3 | Status: SHIPPED | OUTPATIENT
Start: 2020-09-22

## 2020-12-27 ENCOUNTER — HEALTH MAINTENANCE LETTER (OUTPATIENT)
Age: 35
End: 2020-12-27

## 2021-04-24 ENCOUNTER — HEALTH MAINTENANCE LETTER (OUTPATIENT)
Age: 36
End: 2021-04-24

## 2021-08-14 ENCOUNTER — HEALTH MAINTENANCE LETTER (OUTPATIENT)
Age: 36
End: 2021-08-14

## 2021-10-09 ENCOUNTER — HEALTH MAINTENANCE LETTER (OUTPATIENT)
Age: 36
End: 2021-10-09

## 2022-03-20 ENCOUNTER — HEALTH MAINTENANCE LETTER (OUTPATIENT)
Age: 37
End: 2022-03-20

## 2022-05-16 ENCOUNTER — HEALTH MAINTENANCE LETTER (OUTPATIENT)
Age: 37
End: 2022-05-16

## 2022-09-11 ENCOUNTER — HEALTH MAINTENANCE LETTER (OUTPATIENT)
Age: 37
End: 2022-09-11

## 2023-01-23 ENCOUNTER — HEALTH MAINTENANCE LETTER (OUTPATIENT)
Age: 38
End: 2023-01-23

## 2023-06-03 ENCOUNTER — HEALTH MAINTENANCE LETTER (OUTPATIENT)
Age: 38
End: 2023-06-03

## 2023-07-29 ENCOUNTER — HEALTH MAINTENANCE LETTER (OUTPATIENT)
Age: 38
End: 2023-07-29

## 2024-02-24 ENCOUNTER — HEALTH MAINTENANCE LETTER (OUTPATIENT)
Age: 39
End: 2024-02-24